# Patient Record
Sex: FEMALE | Race: BLACK OR AFRICAN AMERICAN | NOT HISPANIC OR LATINO | Employment: FULL TIME | URBAN - METROPOLITAN AREA
[De-identification: names, ages, dates, MRNs, and addresses within clinical notes are randomized per-mention and may not be internally consistent; named-entity substitution may affect disease eponyms.]

---

## 2017-01-02 PROBLEM — Z34.90 NORMAL PREGNANCY: Status: ACTIVE | Noted: 2017-01-02

## 2017-01-05 ENCOUNTER — ROUTINE PRENATAL (OUTPATIENT)
Dept: OBSTETRICS AND GYNECOLOGY | Facility: CLINIC | Age: 32
End: 2017-01-05
Payer: MEDICAID

## 2017-01-05 DIAGNOSIS — Z34.93 PREGNANCY WITH ONE FETUS, THIRD TRIMESTER: Primary | ICD-10-CM

## 2017-01-05 DIAGNOSIS — O24.913 DIABETES MELLITUS AFFECTING PREGNANCY IN THIRD TRIMESTER: ICD-10-CM

## 2017-01-05 PROCEDURE — 99999 PR PBB SHADOW E&M-EST. PATIENT-LVL II: CPT | Mod: PBBFAC,,, | Performed by: OBSTETRICS & GYNECOLOGY

## 2017-01-05 PROCEDURE — 99213 OFFICE O/P EST LOW 20 MIN: CPT | Mod: TH,S$PBB,, | Performed by: OBSTETRICS & GYNECOLOGY

## 2017-01-05 PROCEDURE — 99212 OFFICE O/P EST SF 10 MIN: CPT | Mod: PBBFAC | Performed by: OBSTETRICS & GYNECOLOGY

## 2017-01-05 NOTE — MR AVS SNAPSHOT
[facility] - OB/ GYN  [address], [address]  [place] [address]  Phone: [phone]                  Isabel Curry   2017 8:40 AM   Routine Prenatal    Description:  Female : 1985   Provider:  Chavez Gonzales MD   Department:  [facility] - OB/ GYN           Reason for Visit     Routine Prenatal Visit                To Do List           Future Appointments        Provider Department Dept Phone    2017 10:10 AM Chavez Gonzales MD [facility] - OB/ [phone]      Goals (5 Years of Data)     None      [facility]     [facility] Nurse Care Line -  Assistance  Registered nurses in the [facility] provide clinical advisement, health education, appointment booking, and other advisory services.  Call for this free service at [phone].             Medications           Message regarding Medications     Verify the changes and/or additions to your medication regime listed below are the same as discussed with your clinician today.  If any of these changes or additions are incorrect, please notify your healthcare provider.             Verify that the below list of medications is an accurate representation of the medications you are currently taking.  If none reported, the list may be blank. If incorrect, please contact your healthcare provider. Carry this list with you in case of emergency.           Current Medications     blood sugar diagnostic Strp 1 strip by Misc.(Non-Drug; Combo Route) route 4 (four) times daily as needed (fasting and 2 hours after each meals).    blood-glucose meter kit Use as instructed    EASY TOUCH LANCING DEVICE Misc test FOUR TIMES DAILY    ferrous gluconate (FERGON) 325 MG Tab Take 1 tablet (325 mg total) by mouth 2 (two) times daily with meals.    glyBURIDE (DIABETA) 2.5 MG tablet Take 0.5 tablets (1.25 mg total) by mouth 2 (two) times daily with meals.    lancets 30 gauge Misc 1 lancet by Misc.(Non-Drug; Combo Route) route 4 (four)  times daily as needed.    prenatal multivit-Ca-min-Fe-FA (PRENATAL VITAMIN) Tab Take 1 tablet by mouth once daily.    ranitidine (ZANTAC) 150 MG tablet Take 1 tablet (150 mg total) by mouth nightly as needed for Heartburn.    docusate sodium (COLACE) 100 MG capsule Take 1 capsule (100 mg total) by mouth 2 (two) times daily as needed for Constipation.    promethazine (PHENERGAN) 12.5 MG Tab Take 1 tablet (12.5 mg total) by mouth every 6 (six) hours as needed (nausea).           Clinical Reference Information           Prenatal Vitals     Enc. Date GA Prenatal Vitals Prenatal Pulse Pain Level Urine Albumin/Glucose Edema Presentation Dilation/Effacement/Station    1/5/17 34w0d 140/100 (A) / 91.9 kg (202 lb 9.6 oz)   0        1/2/17 33w4d Admission Dx: Normal pregnancy Dept: [facility] L&D    12/29/16 33w0d 127/81 / 92.6 kg (204 lb 2.3 oz) 34 cm / 130 NST / Present 64 0 Negative / Negative None / None      12/26/16 32w4d Admission Dx: Pregnancy Dept: [facility] L[facility]D    12/22/16 32w0d 124/77 / 90.7 kg (199 lb 15.3 oz) 33 cm / 135 NST / Present 72 0 Negative / Negative None / None      12/19/16 31w4d 120/70 / 92.2 kg (203 lb 4.2 oz) 32 cm / 150 NST / Present 60 0 Negative / Negative None / None      12/13/16 30w5d 123/76 / 90.9 kg (200 lb 6.4 oz) 31 cm / 142 / Present 68 0 Negative / Negative None / None      11/15/16 26w5d 120/70 / 89.9 kg (198 lb 3.1 oz) 26 cm / 146 / Present 70 0 Negative / Negative None / None      10/18/16 22w5d 110/74 / 88.2 kg (194 lb 8 oz)  / 150 / Present 64 0 Negative / Negative None / None      10/4/16 20w5d 121/76 / 87.5 kg (193 lb)  / 146 / Present 60 0 Negative / 1+ None / None      9/20/16 18w5d 120/80 / 86.6 kg (191 lb)  / 154 / Present 72 0 Negative / 1+ None / None      9/13/16 17w5d 119/79 / 86.6 kg (191 lb)  / 160 64 0 Negative / Negative None / None      8/16/16 13w5d 121/79 / 85.7 kg (189 lb)  / 158 70 0 Negative / Negative None / None  0 / 0      Vital Signs - Last Recorded  Most recent update:  1/5/2017  8:49 AM by Marylin Mart MA    BP Wt LMP BMI       (!) 140/100 91.9 kg (202 lb 9.6 oz) 05/20/2016 (Exact Date) 32.7 kg/m2       Allergies as of 1/5/2017     No Known Allergies      Immunizations Administered on Date of Encounter - 1/5/2017     None      [facility] Sign-Up     Activating your [facility] account is as easy as 1-2-3!     1) Visit [email or web address], select Sign Up Now, enter this activation code and your date of birth, then select Next.  [email or web address]  Expires: 2/16/2017  3:29 PM      2) Create a username and password to use when you visit [facility] in the future and select a security question in case you lose your password and select Next.    3) Enter your e-mail address and click Sign Up!    Additional Information  If you have questions, please e-mail [email or web address] or call [phone] to talk to our [facility] staff. Remember, [facility] is NOT to be used for urgent needs. For medical emergencies, dial 911.

## 2017-01-06 VITALS
WEIGHT: 202.63 LBS | DIASTOLIC BLOOD PRESSURE: 86 MMHG | BODY MASS INDEX: 32.7 KG/M2 | SYSTOLIC BLOOD PRESSURE: 132 MMHG | HEART RATE: 70 BPM

## 2017-01-06 PROBLEM — O24.419 GDM, CLASS A2: Status: ACTIVE | Noted: 2017-01-06

## 2017-01-06 PROBLEM — O24.913 DIABETES MELLITUS AFFECTING PREGNANCY IN THIRD TRIMESTER: Status: ACTIVE | Noted: 2017-01-06

## 2017-01-06 PROBLEM — Z98.891 H/O CESAREAN SECTION: Status: ACTIVE | Noted: 2017-01-06

## 2017-01-06 NOTE — PROGRESS NOTES
34w0d with DM-type 2 on glyburide, Hep B chronic carrier,  x 2.     Pt here for OB visit and NST.  Reports no major omplaints.   Active fetus.  Denies vaginal bleeding, leakage of fluid, or contractions.    Glucose log reviewed since last visit .  Fasting  and 2 hrs .  Pt states she sometime forgets to take her glyburide during day.  Continue glyburide to 1.25 mg qhs and 2.5 mg at qnoon.  Pt advised to go to L&D if fasting > 120, 2hr > 160.   Encourage healthy lifestyle modifications.    Pt initial BP on arrival was 140/100.  Repeat BP at rest 132/86.  Denies headaches, vision changes, epigastric pain, or acute swelling.  Pt was advised to start home BP monitoring TID.  Implications of uncontrolled HTN on her pregnancy reviewed.  Parameters to call reviewed.  Go to L&D if BP > 150/95.  pre-eclampsia precautions.    NST: Baseline 135, moderate variability, positive acceleration, no decelerations.   [place]: Irritability.  Continue twice wkly NST ~32 wks until delivery.    Labor/DM/preE precautions.  Kick counts.  Voiced understanding.

## 2017-01-09 ENCOUNTER — ROUTINE PRENATAL (OUTPATIENT)
Dept: OBSTETRICS AND GYNECOLOGY | Facility: CLINIC | Age: 32
End: 2017-01-09
Payer: MEDICAID

## 2017-01-09 VITALS
HEART RATE: 68 BPM | WEIGHT: 207.44 LBS | SYSTOLIC BLOOD PRESSURE: 132 MMHG | DIASTOLIC BLOOD PRESSURE: 82 MMHG | BODY MASS INDEX: 33.48 KG/M2

## 2017-01-09 DIAGNOSIS — Z34.93 PREGNANCY WITH ONE FETUS, THIRD TRIMESTER: Primary | ICD-10-CM

## 2017-01-09 DIAGNOSIS — O24.913 DIABETES MELLITUS AFFECTING PREGNANCY IN THIRD TRIMESTER: ICD-10-CM

## 2017-01-09 DIAGNOSIS — O16.3 HYPERTENSION AFFECTING PREGNANCY IN THIRD TRIMESTER: ICD-10-CM

## 2017-01-09 PROCEDURE — 99213 OFFICE O/P EST LOW 20 MIN: CPT | Mod: 25,TH,S$PBB, | Performed by: OBSTETRICS & GYNECOLOGY

## 2017-01-09 PROCEDURE — 59025 FETAL NON-STRESS TEST: CPT | Mod: 26,S$PBB,, | Performed by: OBSTETRICS & GYNECOLOGY

## 2017-01-09 PROCEDURE — 59025 FETAL NON-STRESS TEST: CPT | Mod: PBBFAC | Performed by: OBSTETRICS & GYNECOLOGY

## 2017-01-09 PROCEDURE — 99999 PR PBB SHADOW E&M-EST. PATIENT-LVL I: CPT | Mod: PBBFAC,,, | Performed by: OBSTETRICS & GYNECOLOGY

## 2017-01-09 PROCEDURE — 99211 OFF/OP EST MAY X REQ PHY/QHP: CPT | Mod: PBBFAC,25 | Performed by: OBSTETRICS & GYNECOLOGY

## 2017-01-09 NOTE — PROGRESS NOTES
34w4d with DM-type 2 on glyburide, Hep B chronic carrier,  x 2.     Here for OB visit and NST.  No major omplaints.   Reports active fetus.  Denies vaginal bleeding, leakage of fluid, or contractions.    Glucose log reviewed since last visit .  Fasting 67-91 and 2 hrs  (isolated 204, ate too much desert).  Continue glyburide to 1.25 mg qhs and 2.5 mg at qnoon.  Go to L&D if fasting > 120, 2hr > 160.   Encourage healthy lifestyle modifications.    BP log reviewed, mostly normal range 120-130's/70-80's, few in mild range 140's/102.   BP normal today at clinic.  Denies headaches, vision changes, epigastric pain, or acute swelling.  Continue home BP monitoring TID.  Parameters to call reviewed.  Go to L&D if BP > 150/95.  PreE precautions.    NST: Baseline 140's, moderate variability, positive acceleration, no decelerations.   [place]: no ctx.  Continue twice wkly NST until delivery.    Labor/DM/preE precautions.  Kick counts.  Voiced understanding.

## 2017-01-12 ENCOUNTER — ROUTINE PRENATAL (OUTPATIENT)
Dept: OBSTETRICS AND GYNECOLOGY | Facility: CLINIC | Age: 32
End: 2017-01-12
Payer: MEDICAID

## 2017-01-12 ENCOUNTER — HOSPITAL ENCOUNTER (OUTPATIENT)
Facility: HOSPITAL | Age: 32
Discharge: HOME | End: 2017-01-12
Attending: OBSTETRICS & GYNECOLOGY | Admitting: OBSTETRICS & GYNECOLOGY
Payer: MEDICAID

## 2017-01-12 VITALS
WEIGHT: 206.69 LBS | RESPIRATION RATE: 18 BRPM | OXYGEN SATURATION: 100 % | SYSTOLIC BLOOD PRESSURE: 110 MMHG | DIASTOLIC BLOOD PRESSURE: 70 MMHG | HEART RATE: 62 BPM | BODY MASS INDEX: 33.36 KG/M2 | HEART RATE: 82 BPM | SYSTOLIC BLOOD PRESSURE: 126 MMHG | DIASTOLIC BLOOD PRESSURE: 75 MMHG

## 2017-01-12 DIAGNOSIS — O16.3 HYPERTENSION AFFECTING PREGNANCY IN THIRD TRIMESTER: ICD-10-CM

## 2017-01-12 DIAGNOSIS — Z34.93 PREGNANCY WITH ONE FETUS IN THIRD TRIMESTER: ICD-10-CM

## 2017-01-12 DIAGNOSIS — O24.419 GDM, CLASS A2: ICD-10-CM

## 2017-01-12 DIAGNOSIS — Z34.93 PREGNANCY WITH ONE FETUS, THIRD TRIMESTER: Primary | ICD-10-CM

## 2017-01-12 DIAGNOSIS — Z34.93 PREGNANCY WITH ONE FETUS, THIRD TRIMESTER: ICD-10-CM

## 2017-01-12 PROCEDURE — 99213 OFFICE O/P EST LOW 20 MIN: CPT | Mod: 25,TH,S$PBB, | Performed by: OBSTETRICS & GYNECOLOGY

## 2017-01-12 PROCEDURE — 99999 PR PBB SHADOW E&M-EST. PATIENT-LVL II: CPT | Mod: PBBFAC,,, | Performed by: OBSTETRICS & GYNECOLOGY

## 2017-01-12 PROCEDURE — 59025 FETAL NON-STRESS TEST: CPT

## 2017-01-12 PROCEDURE — 59025 FETAL NON-STRESS TEST: CPT | Mod: 26,S$PBB,, | Performed by: OBSTETRICS & GYNECOLOGY

## 2017-01-12 RX ORDER — ACETAMINOPHEN 500 MG
500 TABLET ORAL EVERY 6 HOURS PRN
Status: CANCELLED | OUTPATIENT
Start: 2017-01-12

## 2017-01-12 RX ORDER — ONDANSETRON 4 MG/1
8 TABLET, ORALLY DISINTEGRATING ORAL EVERY 8 HOURS PRN
Status: CANCELLED | OUTPATIENT
Start: 2017-01-12

## 2017-01-12 RX ORDER — ONDANSETRON 8 MG/1
8 TABLET, ORALLY DISINTEGRATING ORAL EVERY 8 HOURS PRN
Status: DISCONTINUED | OUTPATIENT
Start: 2017-01-12 | End: 2017-01-12 | Stop reason: HOSPADM

## 2017-01-12 RX ORDER — ACETAMINOPHEN 500 MG
500 TABLET ORAL EVERY 6 HOURS PRN
Status: DISCONTINUED | OUTPATIENT
Start: 2017-01-12 | End: 2017-01-12 | Stop reason: HOSPADM

## 2017-01-12 NOTE — NURSING
bg 100. Reactive nst. Pt wants to go home and eat. Will speak to md.    1120  Dr Gonzales ordered to monitor for another 15 min then may discharge home,rb&v.    1155 discharge instructions reviewed with handouts provided and verbal recall.

## 2017-01-12 NOTE — IP AVS SNAPSHOT
[place] [address] [place] [address]  Phone: [phone]           Patient Discharge Instructions     Our goal is to set you up for success. This packet includes information on your condition, medications, and your home care. It will help you to care for yourself so you don't get sicker and need to go back to the hospital.     Please ask your nurse if you have any questions.        There are many details to remember when preparing to leave the hospital. Here is what you will need to do:    1. Take your medicine. If you are prescribed medications, review your Medication List in the following pages. You may have new medications to  at the pharmacy and others that you'll need to stop taking. Review the instructions for how and when to take your medications. Talk with your doctor or nurses if you are unsure of what to do.     2. Go to your follow-up appointments. Specific follow-up information is listed in the following pages. Your may be contacted by a transition nurse or clinical provider about future appointments. Be sure we have all of the phone numbers to reach you, if needed. Please contact your provider's office if you are unable to make an appointment.     3. Watch for warning signs. Your doctor or nurse will give you detailed warning signs to watch for and when to call for assistance. These instructions may also include educational information about your condition. If you experience any of warning signs to your health, call your doctor.               [facility]  Unless otherwise directed by your provider, please contact [facility], our nurse care line that is available for 24/7 assistance.     [phone] (toll-free)    Registered nurses in the [facility] provide clinical advisement, health education, appointment booking, and other advisory services.                    ** Verify the list of medication(s) below is accurate and up to date.  Carry this with you in case of emergency. If your medications have changed, please notify your healthcare provider.             Medication List      ASK your doctor about these medications        Additional Info                      blood sugar diagnostic Strp   Quantity:  100 strip   Refills:  11   Dose:  1 strip    Instructions:  1 strip by Misc.(Non-Drug; Combo Route) route 4 (four) times daily as needed (fasting and 2 hours after each meals).     Begin Date    AM    Noon    PM    Bedtime       blood-glucose meter kit   Quantity:  1 each   Refills:  0   Comments:  Please dispense testing supplies covered by her insurance.    Instructions:  Use as instructed     Begin Date    AM    Noon    PM    Bedtime       docusate sodium 100 MG capsule   Commonly known as:  COLACE   Quantity:  60 capsule   Refills:  1   Dose:  100 mg    Instructions:  Take 1 capsule (100 mg total) by mouth 2 (two) times daily as needed for Constipation.     Begin Date    AM    Noon    PM    Bedtime       EASY TOUCH LANCING DEVICE Misc   Refills:  0   Generic drug:  lancing device    Instructions:  test FOUR TIMES DAILY     Begin Date    AM    Noon    PM    Bedtime       ferrous gluconate 325 MG Tab   Commonly known as:  FERGON   Quantity:  60 tablet   Refills:  1   Dose:  325 mg    Instructions:  Take 1 tablet (325 mg total) by mouth 2 (two) times daily with meals.     Begin Date    AM    Noon    PM    Bedtime       glyBURIDE 2.5 MG tablet   Commonly known as:  DIABETA   Quantity:  90 tablet   Refills:  1   Dose:  1.25 mg    Instructions:  Take 0.5 tablets (1.25 mg total) by mouth 2 (two) times daily with meals.     Begin Date    AM    Noon    PM    Bedtime       lancets 30 gauge Misc   Quantity:  100 each   Refills:  11   Dose:  1 lancet   Comments:  Please dispense testing supplies covered by her insurance.    Instructions:  1 lancet by Misc.(Non-Drug; Combo Route) route 4 (four) times daily as needed.     Begin Date    AM    Noon    PM     Bedtime       prenatal multivit-Ca-min-Fe-FA Tab   Commonly known as:  PRENATAL VITAMIN   Quantity:  30 each   Refills:  11   Dose:  1 tablet   Comments:  Please substitute for a comparable prenatal vitamins covered by patient's insurance plan affordable to patient. Also, dispense a 90 days supply when possible if requested by patient. Thanks.    Instructions:  Take 1 tablet by mouth once daily.     Begin Date    AM    Noon    PM    Bedtime       promethazine 12.5 MG Tab   Commonly known as:  PHENERGAN   Quantity:  30 tablet   Refills:  1   Dose:  12.5 mg    Instructions:  Take 1 tablet (12.5 mg total) by mouth every 6 (six) hours as needed (nausea).     Begin Date    AM    Noon    PM    Bedtime       ranitidine 150 MG tablet   Commonly known as:  ZANTAC   Quantity:  30 tablet   Refills:  1   Dose:  150 mg    Instructions:  Take 1 tablet (150 mg total) by mouth nightly as needed for Heartburn.     Begin Date    AM    Noon    PM    Bedtime                  Please bring to all follow up appointments:    1. A copy of your discharge instructions.  2. All medicines you are currently taking in their original bottles.  3. Identification and insurance card.    Please arrive 15 minutes ahead of scheduled appointment time.    Please call 24 hours in advance if you must reschedule your appointment and/or time.        Your Scheduled Appointments     Jan 16, 2017  9:40 AM CST   Routine Prenatal Visit with Chavez Gonzales MD   [facility] [place] [facility] [address] [address]   [phone]            Jan 19, 2017 10:00 AM CST   Routine Prenatal Visit with Chavez Gonzales MD   [facility] [place] [facility] [address] [address]   [phone]                  Discharge Instructions       Home Undelivered Discharge Instructions    After Discharge Orders:    Future Appointments  Date Time Provider Department Center   1/16/2017 9:40 AM Chavez Gonzales MD [facility] OBGYN  [facility]   1/19/2017 10:00 AM Chavez Gonzales MD [facility] [place]       Call physician or midwife's office for any problems or concerns      Current Discharge Medication List      CONTINUE these medications which have NOT CHANGED    Details   blood sugar diagnostic Strp 1 strip by Misc.(Non-Drug; Combo Route) route 4 (four) times daily as needed (fasting and 2 hours after each meals).  Qty: 100 strip, Refills: 11    Associated Diagnoses: Diabetes mellitus complicating pregnancy, antepartum, unspecified trimester      blood-glucose meter kit Use as instructed  Qty: 1 each, Refills: 0    Comments: Please dispense testing supplies covered by her insurance.  Associated Diagnoses: Diabetes mellitus complicating pregnancy, antepartum, unspecified trimester      docusate sodium (COLACE) 100 MG capsule Take 1 capsule (100 mg total) by mouth 2 (two) times daily as needed for Constipation.  Qty: 60 capsule, Refills: 1    Associated Diagnoses: Anemia in pregnancy, unspecified trimester      EASY TOUCH LANCING DEVICE Misc test FOUR TIMES DAILY  Refills: 0      ferrous gluconate (FERGON) 325 MG Tab Take 1 tablet (325 mg total) by mouth 2 (two) times daily with meals.  Qty: 60 tablet, Refills: 1    Associated Diagnoses: Anemia in pregnancy, unspecified trimester      glyBURIDE (DIABETA) 2.5 MG tablet Take 0.5 tablets (1.25 mg total) by mouth 2 (two) times daily with meals.  Qty: 90 tablet, Refills: 1    Associated Diagnoses: Diabetes mellitus affecting pregnancy, second trimester      lancets 30 gauge Misc 1 lancet by Misc.(Non-Drug; Combo Route) route 4 (four) times daily as needed.  Qty: 100 each, Refills: 11    Comments: Please dispense testing supplies covered by her insurance.  Associated Diagnoses: Diabetes mellitus complicating pregnancy, antepartum, unspecified trimester      prenatal multivit-Ca-min-Fe-FA (PRENATAL VITAMIN) Tab Take 1 tablet by mouth once daily.  Qty: 30 each, Refills: 11    Comments: Please  substitute for a comparable prenatal vitamins covered by patient's insurance plan affordable to patient. Also, dispense a 90 days supply when possible if requested by patient. Thanks.  Associated Diagnoses: Encounter for confirmation of pregnancy test result with physical examination      promethazine (PHENERGAN) 12.5 MG Tab Take 1 tablet (12.5 mg total) by mouth every 6 (six) hours as needed (nausea).  Qty: 30 tablet, Refills: 1    Associated Diagnoses: Nausea/vomiting in pregnancy      ranitidine (ZANTAC) 150 MG tablet Take 1 tablet (150 mg total) by mouth nightly as needed for Heartburn.  Qty: 30 tablet, Refills: 1    Associated Diagnoses: Gastroesophageal reflux disease, esophagitis presence not specified                     · Diet:  normal diet as tolerated    · Rest: normal activity as tolerated    Other instructions: Do kick counts once a day on your baby. Choose the time of day your baby is most active. Get in a comfortable lying or sitting position and time how long it takes to feel 10 kicks, twists, turns, swishes, or rolls. Call your physician or midwife if there have not been 10 kicks in 1.5 hours    Call physician or midwife, return to Labor and Delivery, call 911, or go to the nearest Emergency Room if: increased leakage or fluid, contractions more than  6 per  1 hour, decreased fetal movement, bleeding from vaginal area, difficulty urinating, pain with urination, difficulty breathing, new calf pain, persistent headache or vision change                 Admission Information     Date & Time Provider Department CSN    1/12/2017 10:42 AM Chavez Gonzales MD [facility] [id number]      Care Providers     Provider Role Specialty Primary office phone    Chavez Gonzales MD Attending Provider Obstetrics and Gynecology [phone]      Your Vitals Were     Last Period                   05/20/2016 (Exact Date)           Recent Lab Values        6/6/2013 8/16/2016 11/15/2016                     7:00  PM  9:40 AM 10:17 AM         A1C 6.5 (H) 6.3 (H) 5.9         Comment for A1C at  9:40 AM on 8/16/2016:  According to ADA guidelines, hemoglobin A1C <7.0% represents  optimal control in non-pregnant diabetic patients.  Different  metrics may apply to specific populations.   Standards of Medical Care in Diabetes - 2016.  For the purpose of screening for the presence of diabetes:  <5.7%     Consistent with the absence of diabetes  5.7-6.4%  Consistent with increasing risk for diabetes   (prediabetes)  >or=6.5%  Consistent with diabetes  Currently no consensus exists for use of hemoglobin A1C  for diagnosis of diabetes for children.      Comment for A1C at 10:17 AM on 11/15/2016:  According to ADA guidelines, hemoglobin A1C <7.0% represents  optimal control in non-pregnant diabetic patients.  Different  metrics may apply to specific populations.   Standards of Medical Care in Diabetes - 2016.  For the purpose of screening for the presence of diabetes:  <5.7%     Consistent with the absence of diabetes  5.7-6.4%  Consistent with increasing risk for diabetes   (prediabetes)  >or=6.5%  Consistent with diabetes  Currently no consensus exists for use of hemoglobin A1C  for diagnosis of diabetes for children.        Allergies as of 1/12/2017     No Known Allergies      Advance Directives     An advance directive is a document which, in the event you are no longer able to make decisions for yourself, tells your healthcare team what kind of treatment you do or do not want to receive, or who you would like to make those decisions for you.  If you do not currently have an advance directive, [facility] encourages you to create one.  For more information call:  [phone]), [phone] ([phone]),  or log on to [email or web address].        Language Assistance Services     ATTENTION: Language assistance services are available, free of charge. Please call [phone].      ATENCIÓN: alyssia Pabon  almanza disposición servicios gratuitos de asistencia lingüística. Llgeorgia al [phone].     [facility] Ý: N?u b?n nói Ti?ng Vi?t, có các d?ch v? h? tr? ngôn ng? mi?n phí dành cho b?n. G?i s? [phone].        Diabetes Discharge Instructions                                   [facility] Sign-Up     Activating your [facility] account is as easy as 1-2-3!     1) Visit [email or web address], select Sign Up Now, enter this activation code and your date of birth, then select Next.  [email or web address]  Expires: 2/16/2017  3:29 PM      2) Create a username and password to use when you visit [facility] in the future and select a security question in case you lose your password and select Next.    3) Enter your e-mail address and click Sign Up!    Additional Information  If you have questions, please e-mail [email or web address] or call [phone] to talk to our [facility] staff. Remember, [facility] is NOT to be used for urgent needs. For medical emergencies, dial 911.          [facility] complies with applicable Federal civil rights laws and does not discriminate on the basis of race, color, national origin, age, disability, or sex.

## 2017-01-12 NOTE — MR AVS SNAPSHOT
[facility] - OB/ GYN  [address], [address]  [place] [address]  Phone: [phone]                  Isabel Curry   2017 10:10 AM   Routine Prenatal    Description:  Female : 1985   Provider:  Chavez Gonzales MD   Department:  [facility] [place] - OB/ GYN           Reason for Visit     Routine Prenatal Visit                To Do List           Future Appointments        Provider Department Dept Phone    2017 10:10 AM Chavez Gonzales MD [facility] OB/ -[phone]    2017 9:40 AM Chavez Gonzales MD [facility] OB/ -[phone]    2017 10:00 AM Chavez Gonzales MD [facility] OB/ -[phone]      Goals (5 Years of Data)     None      [facility]     [facility] Care [facility] - / Assistance  Registered nurses in the [facility] On Call Center provide clinical advisement, health education, appointment booking, and other advisory services.  Call for this free service at [phone].             Medications           Message regarding Medications     Verify the changes and/or additions to your medication regime listed below are the same as discussed with your clinician today.  If any of these changes or additions are incorrect, please notify your healthcare provider.             Verify that the below list of medications is an accurate representation of the medications you are currently taking.  If none reported, the list may be blank. If incorrect, please contact your healthcare provider. Carry this list with you in case of emergency.           Current Medications     blood sugar diagnostic Strp 1 strip by Misc.(Non-Drug; Combo Route) route 4 (four) times daily as needed (fasting and 2 hours after each meals).    blood-glucose meter kit Use as instructed    docusate sodium (COLACE) 100 MG capsule Take 1 capsule (100 mg total) by mouth 2 (two) times daily as needed for Constipation.    EASY TOUCH LANCING DEVICE Misc test FOUR TIMES DAILY    ferrous gluconate (FERGON)  325 MG Tab Take 1 tablet (325 mg total) by mouth 2 (two) times daily with meals.    glyBURIDE (DIABETA) 2.5 MG tablet Take 0.5 tablets (1.25 mg total) by mouth 2 (two) times daily with meals.    lancets 30 gauge Misc 1 lancet by Misc.(Non-Drug; Combo Route) route 4 (four) times daily as needed.    prenatal multivit-Ca-min-Fe-FA (PRENATAL VITAMIN) Tab Take 1 tablet by mouth once daily.    promethazine (PHENERGAN) 12.5 MG Tab Take 1 tablet (12.5 mg total) by mouth every 6 (six) hours as needed (nausea).    ranitidine (ZANTAC) 150 MG tablet Take 1 tablet (150 mg total) by mouth nightly as needed for Heartburn.           Clinical Reference Information           Prenatal Vitals     Enc. Date GA Prenatal Vitals Prenatal Pulse Pain Level Urine Albumin/Glucose Edema Presentation Dilation/Effacement/Station    1/12/17 35w0d 110/70 / 93.8 kg (206 lb 10.9 oz)    Negative / Negative       1/9/17 34w4d 132/82 / 94.1 kg (207 lb 7.3 oz) 34 cm / 140 NST / Present 68 0 Trace / Negative None / None      1/5/17 34w0d 132/86 / 91.9 kg (202 lb 9.6 oz) 34 cm / 135 NST / Present 70 0 Negative / Negative None / None      1/2/17 33w4d Admission Dx: Normal pregnancy Dept: [facility] L&D    12/29/16 33w0d 127/81 / 92.6 kg (204 lb 2.3 oz) 34 cm / 130 NST / Present 64 0 Negative / Negative None / None      12/26/16 32w4d Admission Dx: Pregnancy Dept: [facility] L&D    12/22/16 32w0d 124/77 / 90.7 kg (199 lb 15.3 oz) 33 cm / 135 NST / Present 72 0 Negative / Negative None / None      12/19/16 31w4d 120/70 / 92.2 kg (203 lb 4.2 oz) 32 cm / 150 NST / Present 60 0 Negative / Negative None / None      12/13/16 30w5d 123/76 / 90.9 kg (200 lb 6.4 oz) 31 cm / 142 / Present 68 0 Negative / Negative None / None      11/15/16 26w5d 120/70 / 89.9 kg (198 lb 3.1 oz) 26 cm / 146 / Present 70 0 Negative / Negative None / None      10/18/16 22w5d 110/74 / 88.2 kg (194 lb 8 oz)  / 150 / Present 64 0 Negative / Negative None / None      10/4/16 20w5d 121/76 / 87.5 kg (193  lb)  / 146 / Present 60 0 Negative / 1+ None / None      9/20/16 18w5d 120/80 / 86.6 kg (191 lb)  / 154 / Present 72 0 Negative / 1+ None / None      9/13/16 17w5d 119/79 / 86.6 kg (191 lb)  / 160 64 0 Negative / Negative None / None      8/16/16 13w5d 121/79 / 85.7 kg (189 lb)  / 158 70 0 Negative / Negative None / None  0 / 0      Vital Signs - Last Recorded  Most recent update: 1/12/2017  9:03 AM by Silvia Damico, LPN    BP Wt LMP BMI       110/70 93.8 kg (206 lb 10.9 oz) 05/20/2016 (Exact Date) 33.36 kg/m2       Allergies as of 1/12/2017     No Known Allergies      Immunizations Administered on Date of Encounter - 1/12/2017     None      [facility] Sign-Up     Activating your [facility] account is as easy as 1-2-3!     1) Visit [email or web address], select Sign Up Now, enter this activation code and your date of birth, then select Next.  [email or web address]  Expires: 2/16/2017  3:29 PM      2) Create a username and password to use when you visit [facility] in the future and select a security question in case you lose your password and select Next.    3) Enter your e-mail address and click Sign Up!    Additional Information  If you have questions, please e-mail [email or web address] or call [phone] to talk to our [facility] staff. Remember, [facility] is NOT to be used for urgent needs. For medical emergencies, dial 911.

## 2017-01-12 NOTE — DISCHARGE INSTRUCTIONS
Home Undelivered Discharge Instructions    After Discharge Orders:    Future Appointments  Date Time Provider Department Center   1/16/2017 9:40 AM Chavez Gonzales MD [facility]   1/19/2017 10:00 AM Chavez Gonzales MD [facility]       Call physician or midwife's office for any problems or concerns      Current Discharge Medication List      CONTINUE these medications which have NOT CHANGED    Details   blood sugar diagnostic Strp 1 strip by Misc.(Non-Drug; Combo Route) route 4 (four) times daily as needed (fasting and 2 hours after each meals).  Qty: 100 strip, Refills: 11    Associated Diagnoses: Diabetes mellitus complicating pregnancy, antepartum, unspecified trimester      blood-glucose meter kit Use as instructed  Qty: 1 each, Refills: 0    Comments: Please dispense testing supplies covered by her insurance.  Associated Diagnoses: Diabetes mellitus complicating pregnancy, antepartum, unspecified trimester      docusate sodium (COLACE) 100 MG capsule Take 1 capsule (100 mg total) by mouth 2 (two) times daily as needed for Constipation.  Qty: 60 capsule, Refills: 1    Associated Diagnoses: Anemia in pregnancy, unspecified trimester      EASY TOUCH LANCING DEVICE Misc test FOUR TIMES DAILY  Refills: 0      ferrous gluconate (FERGON) 325 MG Tab Take 1 tablet (325 mg total) by mouth 2 (two) times daily with meals.  Qty: 60 tablet, Refills: 1    Associated Diagnoses: Anemia in pregnancy, unspecified trimester      glyBURIDE (DIABETA) 2.5 MG tablet Take 0.5 tablets (1.25 mg total) by mouth 2 (two) times daily with meals.  Qty: 90 tablet, Refills: 1    Associated Diagnoses: Diabetes mellitus affecting pregnancy, second trimester      lancets 30 gauge Misc 1 lancet by Misc.(Non-Drug; Combo Route) route 4 (four) times daily as needed.  Qty: 100 each, Refills: 11    Comments: Please dispense testing supplies covered by her insurance.  Associated Diagnoses: Diabetes mellitus complicating pregnancy,  antepartum, unspecified trimester      prenatal multivit-Ca-min-Fe-FA (PRENATAL VITAMIN) Tab Take 1 tablet by mouth once daily.  Qty: 30 each, Refills: 11    Comments: Please substitute for a comparable prenatal vitamins covered by patient's insurance plan affordable to patient. Also, dispense a 90 days supply when possible if requested by patient. Thanks.  Associated Diagnoses: Encounter for confirmation of pregnancy test result with physical examination      promethazine (PHENERGAN) 12.5 MG Tab Take 1 tablet (12.5 mg total) by mouth every 6 (six) hours as needed (nausea).  Qty: 30 tablet, Refills: 1    Associated Diagnoses: Nausea/vomiting in pregnancy      ranitidine (ZANTAC) 150 MG tablet Take 1 tablet (150 mg total) by mouth nightly as needed for Heartburn.  Qty: 30 tablet, Refills: 1    Associated Diagnoses: Gastroesophageal reflux disease, esophagitis presence not specified                     · Diet:  normal diet as tolerated    · Rest: normal activity as tolerated    Other instructions: Do kick counts once a day on your baby. Choose the time of day your baby is most active. Get in a comfortable lying or sitting position and time how long it takes to feel 10 kicks, twists, turns, swishes, or rolls. Call your physician or midwife if there have not been 10 kicks in 1.5 hours    Call physician or midwife, return to Labor and Delivery, call 911, or go to the nearest Emergency Room if: increased leakage or fluid, contractions more than  6 per  1 hour, decreased fetal movement, bleeding from vaginal area, difficulty urinating, pain with urination, difficulty breathing, new calf pain, persistent headache or vision change

## 2017-01-12 NOTE — IP AVS SNAPSHOT
[address] [place] [address]  Phone: [phone]           I have received a copy of my After Visit Summary and discharge instructions from [facility].    INSTRUCTIONS RECEIVED AND UNDERSTOOD BY:                     Patient/Patient Representative: ________________________________________________________________     Date/Time: ________________________________________________________________                     Instructions Given By: ________________________________________________________________     Date/Time: ________________________________________________________________

## 2017-01-12 NOTE — NURSING
Presents to unit from md office for nst. Monitors applied. Active fetal movement. Fasting bg this am 82, no food yet today. Reviewed poc with verbal recall.

## 2017-01-13 LAB — POCT GLUCOSE: 100 MG/DL (ref 70–110)

## 2017-01-13 NOTE — PROGRESS NOTES
35w0d with DM-type 2 on glyburide, Hep B chronic carrier,  x 2.     Pt here for OB visit and NST secondary to GDM-A2.  Pt has no major omplaints.   Reports very active fetus.  Denies vaginal bleeding, leakage of fluid, or contractions.    Glucose log reviewed since last visit .  Fasting 67-82 and 2 hrs .  Continue glyburide to 1.25 mg qhs and 2.5 mg at qnoon.  Go to L&D if fasting > 120, 2hr > 160.     BP log reviewed, mostly normal range 120-130's/70-80's, few in mild range 140's/70-80's.   Denies headaches, vision changes, epigastric pain, or acute swelling.  Continue BP monitoring TID.  Go to L&D if BP > 150/95.  PreE precautions.    Will send to L&D for NST today.  Continue twice wkly NST until delivery.    Plan for delivery ~37-38 wks secondary to gestational HTN.    Labor/DM/preE precautions.  Kick counts.  Voiced understanding.

## 2017-01-16 ENCOUNTER — ROUTINE PRENATAL (OUTPATIENT)
Dept: OBSTETRICS AND GYNECOLOGY | Facility: CLINIC | Age: 32
End: 2017-01-16
Payer: MEDICAID

## 2017-01-16 VITALS
BODY MASS INDEX: 33.09 KG/M2 | SYSTOLIC BLOOD PRESSURE: 122 MMHG | HEART RATE: 60 BPM | DIASTOLIC BLOOD PRESSURE: 74 MMHG | WEIGHT: 205 LBS

## 2017-01-16 DIAGNOSIS — O16.3 HYPERTENSION AFFECTING PREGNANCY IN THIRD TRIMESTER: ICD-10-CM

## 2017-01-16 DIAGNOSIS — O24.419 GDM, CLASS A2: ICD-10-CM

## 2017-01-16 DIAGNOSIS — Z34.93 PREGNANCY WITH ONE FETUS, THIRD TRIMESTER: Primary | ICD-10-CM

## 2017-01-16 DIAGNOSIS — O24.913 DIABETES MELLITUS AFFECTING PREGNANCY IN THIRD TRIMESTER: ICD-10-CM

## 2017-01-16 PROCEDURE — 59025 FETAL NON-STRESS TEST: CPT | Mod: PBBFAC | Performed by: OBSTETRICS & GYNECOLOGY

## 2017-01-16 PROCEDURE — 99213 OFFICE O/P EST LOW 20 MIN: CPT | Mod: 25,TH,S$PBB, | Performed by: OBSTETRICS & GYNECOLOGY

## 2017-01-16 PROCEDURE — 99212 OFFICE O/P EST SF 10 MIN: CPT | Mod: PBBFAC,25 | Performed by: OBSTETRICS & GYNECOLOGY

## 2017-01-16 PROCEDURE — 99999 PR PBB SHADOW E&M-EST. PATIENT-LVL II: CPT | Mod: PBBFAC,,, | Performed by: OBSTETRICS & GYNECOLOGY

## 2017-01-16 PROCEDURE — 59025 FETAL NON-STRESS TEST: CPT | Mod: 26,S$PBB,, | Performed by: OBSTETRICS & GYNECOLOGY

## 2017-01-16 NOTE — MR AVS SNAPSHOT
[facility] - OB/ GYN  [address], [address]  [place] [address]  Phone: [phone]                  Isabel Curry   2017 9:40 AM   Routine Prenatal    Description:  Female : 1985   Provider:  Chavez Gonzales MD   Department:  [facility] - OB/ GYN           Reason for Visit     Routine Prenatal Visit                To Do List           Future Appointments        Provider Department Dept Phone    2017 10:00 AM Chavez Gonzales MD [facility] - OB/ -[phone]      Goals (5 Years of Data)     None      [facility]     [facility] Nurse Care Line -  Assistance  Registered nurses in the [facility] Center provide clinical advisement, health education, appointment booking, and other advisory services.  Call for this free service at [phone].             Medications           Message regarding Medications     Verify the changes and/or additions to your medication regime listed below are the same as discussed with your clinician today.  If any of these changes or additions are incorrect, please notify your healthcare provider.             Verify that the below list of medications is an accurate representation of the medications you are currently taking.  If none reported, the list may be blank. If incorrect, please contact your healthcare provider. Carry this list with you in case of emergency.           Current Medications     blood sugar diagnostic Strp 1 strip by Misc.(Non-Drug; Combo Route) route 4 (four) times daily as needed (fasting and 2 hours after each meals).    blood-glucose meter kit Use as instructed    docusate sodium (COLACE) 100 MG capsule Take 1 capsule (100 mg total) by mouth 2 (two) times daily as needed for Constipation.    EASY TOUCH LANCING DEVICE Misc test FOUR TIMES DAILY    ferrous gluconate (FERGON) 325 MG Tab Take 1 tablet (325 mg total) by mouth 2 (two) times daily with meals.    glyBURIDE (DIABETA) 2.5 MG tablet Take 0.5 tablets (1.25 mg  total) by mouth 2 (two) times daily with meals.    lancets 30 gauge Misc 1 lancet by Misc.(Non-Drug; Combo Route) route 4 (four) times daily as needed.    prenatal multivit-Ca-min-Fe-FA (PRENATAL VITAMIN) Tab Take 1 tablet by mouth once daily.    promethazine (PHENERGAN) 12.5 MG Tab Take 1 tablet (12.5 mg total) by mouth every 6 (six) hours as needed (nausea).    ranitidine (ZANTAC) 150 MG tablet Take 1 tablet (150 mg total) by mouth nightly as needed for Heartburn.           Clinical Reference Information           Prenatal Vitals     Enc. Date GA Prenatal Vitals Prenatal Pulse Pain Level Urine Albumin/Glucose Edema Presentation Dilation/Effacement/Station    1/16/17 35w4d 122/74 / 93 kg (205 lb)    Negative / Negative       1/12/17 35w0d Admission Dept: [facility] L&D    1/12/17 35w0d 110/70 / 93.8 kg (206 lb 10.9 oz) 35 cm / 146 / Present 62 0 Negative / Negative None / None      1/9/17 34w4d 132/82 / 94.1 kg (207 lb 7.3 oz) 34 cm / 140 NST / Present 68 0 Trace / Negative None / None      1/5/17 34w0d 132/86 / 91.9 kg (202 lb 9.6 oz) 34 cm / 135 NST / Present 70 0 Negative / Negative None / None      1/2/17 33w4d Admission Dx: Normal pregnancy Dept: [facility] L&D    12/29/16 33w0d 127/81 / 92.6 kg (204 lb 2.3 oz) 34 cm / 130 NST / Present 64 0 Negative / Negative None / None      12/26/16 32w4d Admission Dx: Pregnancy Dept: [facility] L&D    12/22/16 32w0d 124/77 / 90.7 kg (199 lb 15.3 oz) 33 cm / 135 NST / Present 72 0 Negative / Negative None / None      12/19/16 31w4d 120/70 / 92.2 kg (203 lb 4.2 oz) 32 cm / 150 NST / Present 60 0 Negative / Negative None / None      12/13/16 30w5d 123/76 / 90.9 kg (200 lb 6.4 oz) 31 cm / 142 / Present 68 0 Negative / Negative None / None      11/15/16 26w5d 120/70 / 89.9 kg (198 lb 3.1 oz) 26 cm / 146 / Present 70 0 Negative / Negative None / None      10/18/16 22w5d 110/74 / 88.2 kg (194 lb 8 oz)  / 150 / Present 64 0 Negative / Negative None / None      10/4/16 20w5d 121/76 / 87.5 kg (193  lb)  / 146 / Present 60 0 Negative / 1+ None / None      9/20/16 18w5d 120/80 / 86.6 kg (191 lb)  / 154 / Present 72 0 Negative / 1+ None / None      9/13/16 17w5d 119/79 / 86.6 kg (191 lb)  / 160 64 0 Negative / Negative None / None      8/16/16 13w5d 121/79 / 85.7 kg (189 lb)  / 158 70 0 Negative / Negative None / None  0 / 0      Vital Signs - Last Recorded  Most recent update: 1/16/2017 10:29 AM by Danni Madden MA    BP Wt LMP BMI       122/74 93 kg (205 lb) 05/20/2016 (Exact Date) 33.09 kg/m2       Allergies as of 1/16/2017     No Known Allergies      Immunizations Administered on Date of Encounter - 1/16/2017     None      [facility] Sign-Up     Activating your [facility] account is as easy as 1-2-3!     1) Visit [email or web address], select Sign Up Now, enter this activation code and your date of birth, then select Next.  [email or web address]  Expires: 2/16/2017  3:29 PM      2) Create a username and password to use when you visit [facility] in the future and select a security question in case you lose your password and select Next.    3) Enter your e-mail address and click Sign Up!    Additional Information  If you have questions, please e-mail [email or web address] or call [phone] to talk to our [facility] staff. Remember, [facility] is NOT to be used for urgent needs. For medical emergencies, dial 911.

## 2017-01-19 ENCOUNTER — ROUTINE PRENATAL (OUTPATIENT)
Dept: OBSTETRICS AND GYNECOLOGY | Facility: CLINIC | Age: 32
End: 2017-01-19
Payer: MEDICAID

## 2017-01-19 VITALS
HEART RATE: 72 BPM | SYSTOLIC BLOOD PRESSURE: 132 MMHG | BODY MASS INDEX: 33.41 KG/M2 | WEIGHT: 207 LBS | DIASTOLIC BLOOD PRESSURE: 72 MMHG

## 2017-01-19 DIAGNOSIS — O13.3 GESTATIONAL HYPERTENSION, THIRD TRIMESTER: ICD-10-CM

## 2017-01-19 DIAGNOSIS — O24.913 DIABETES MELLITUS AFFECTING PREGNANCY IN THIRD TRIMESTER: ICD-10-CM

## 2017-01-19 PROCEDURE — 59025 FETAL NON-STRESS TEST: CPT | Mod: PBBFAC | Performed by: OBSTETRICS & GYNECOLOGY

## 2017-01-19 PROCEDURE — 99212 OFFICE O/P EST SF 10 MIN: CPT | Mod: PBBFAC,25 | Performed by: OBSTETRICS & GYNECOLOGY

## 2017-01-19 PROCEDURE — 59025 FETAL NON-STRESS TEST: CPT | Mod: 26,S$PBB,, | Performed by: OBSTETRICS & GYNECOLOGY

## 2017-01-19 PROCEDURE — 99999 PR PBB SHADOW E&M-EST. PATIENT-LVL II: CPT | Mod: PBBFAC,,, | Performed by: OBSTETRICS & GYNECOLOGY

## 2017-01-19 PROCEDURE — 99213 OFFICE O/P EST LOW 20 MIN: CPT | Mod: 25,TH,S$PBB, | Performed by: OBSTETRICS & GYNECOLOGY

## 2017-01-19 NOTE — MR AVS SNAPSHOT
[facility] [place] - OB/ GYN  [address], [address]  [place] [address]  Phone: [phone]                  Isabel Curry   2017 10:00 AM   Routine Prenatal    Description:  Female : 1985   Provider:  Chavez Gonzales MD   Department:  [facility] [place] - OB/ GYN           Reason for Visit     Routine Prenatal Visit                To Do List           Goals (5 Years of Data)     None      [facility]     [facility] Nurse Care Line -  Assistance  Registered nurses in the [facility] Center provide clinical advisement, health education, appointment booking, and other advisory services.  Call for this free service at [phone].             Medications           Message regarding Medications     Verify the changes and/or additions to your medication regime listed below are the same as discussed with your clinician today.  If any of these changes or additions are incorrect, please notify your healthcare provider.             Verify that the below list of medications is an accurate representation of the medications you are currently taking.  If none reported, the list may be blank. If incorrect, please contact your healthcare provider. Carry this list with you in case of emergency.           Current Medications     blood sugar diagnostic Strp 1 strip by Misc.(Non-Drug; Combo Route) route 4 (four) times daily as needed (fasting and 2 hours after each meals).    blood-glucose meter kit Use as instructed    docusate sodium (COLACE) 100 MG capsule Take 1 capsule (100 mg total) by mouth 2 (two) times daily as needed for Constipation.    EASY TOUCH LANCING DEVICE Misc test FOUR TIMES DAILY    ferrous gluconate (FERGON) 325 MG Tab Take 1 tablet (325 mg total) by mouth 2 (two) times daily with meals.    glyBURIDE (DIABETA) 2.5 MG tablet Take 0.5 tablets (1.25 mg total) by mouth 2 (two) times daily with meals.    lancets 30 gauge Misc 1 lancet by Misc.(Non-Drug; Combo Route) route 4 (four) times  daily as needed.    prenatal multivit-Ca-min-Fe-FA (PRENATAL VITAMIN) Tab Take 1 tablet by mouth once daily.    promethazine (PHENERGAN) 12.5 MG Tab Take 1 tablet (12.5 mg total) by mouth every 6 (six) hours as needed (nausea).    ranitidine (ZANTAC) 150 MG tablet Take 1 tablet (150 mg total) by mouth nightly as needed for Heartburn.           Clinical Reference Information           Prenatal Vitals     Enc. Date GA Prenatal Vitals Prenatal Pulse Pain Level Urine Albumin/Glucose Edema Presentation Dilation/Effacement/Station    1/19/17 36w0d 132/72 / 93.9 kg (207 lb)    Negative / Trace       1/16/17 35w4d 122/74 / 93 kg (205 lb) 36 cm / 145 NST / Present 60 0 Negative / Negative None / None      1/12/17 35w0d Admission Dept: [facility] L[facility]    1/12/17 35w0d 110/70 / 93.8 kg (206 lb 10.9 oz) 35 cm / 146 / Present 62 0 Negative / Negative None / None      1/9/17 34w4d 132/82 / 94.1 kg (207 lb 7.3 oz) 34 cm / 140 NST / Present 68 0 Trace / Negative None / None      1/5/17 34w0d 132/86 / 91.9 kg (202 lb 9.6 oz) 34 cm / 135 NST / Present 70 0 Negative / Negative None / None      1/2/17 33w4d Admission Dx: Normal pregnancy Dept: [facility] L&D    12/29/16 33w0d 127/81 / 92.6 kg (204 lb 2.3 oz) 34 cm / 130 NST / Present 64 0 Negative / Negative None / None      12/26/16 32w4d Admission Dx: Pregnancy Dept: [facility] L&D    12/22/16 32w0d 124/77 / 90.7 kg (199 lb 15.3 oz) 33 cm / 135 NST / Present 72 0 Negative / Negative None / None      12/19/16 31w4d 120/70 / 92.2 kg (203 lb 4.2 oz) 32 cm / 150 NST / Present 60 0 Negative / Negative None / None      12/13/16 30w5d 123/76 / 90.9 kg (200 lb 6.4 oz) 31 cm / 142 / Present 68 0 Negative / Negative None / None      11/15/16 26w5d 120/70 / 89.9 kg (198 lb 3.1 oz) 26 cm / 146 / Present 70 0 Negative / Negative None / None      10/18/16 22w5d 110/74 / 88.2 kg (194 lb 8 oz)  / 150 / Present 64 0 Negative / Negative None / None      10/4/16 20w5d 121/76 / 87.5 kg (193 lb)  / 146 / Present 60 0  Negative / 1+ None / None      9/20/16 18w5d 120/80 / 86.6 kg (191 lb)  / 154 / Present 72 0 Negative / 1+ None / None      9/13/16 17w5d 119/79 / 86.6 kg (191 lb)  / 160 64 0 Negative / Negative None / None      8/16/16 13w5d 121/79 / 85.7 kg (189 lb)  / 158 70 0 Negative / Negative None / None  0 / 0      Vital Signs - Last Recorded  Most recent update: 1/19/2017 10:07 AM by Danni Madden MA    BP Wt LMP BMI       132/72 93.9 kg (207 lb) 05/20/2016 (Exact Date) 33.41 kg/m2       Allergies as of 1/19/2017     No Known Allergies      Immunizations Administered on Date of Encounter - 1/19/2017     None      [facility] Sign-Up     Activating your [facility] account is as easy as 1-2-3!     1) Visit [email or web address], select Sign Up Now, enter this activation code and your date of birth, then select Next.  [email or web address]  Expires: 2/16/2017  3:29 PM      2) Create a username and password to use when you visit [facility] in the future and select a security question in case you lose your password and select Next.    3) Enter your e-mail address and click Sign Up!    Additional Information  If you have questions, please e-mail [email or web address] or call [phone] to talk to our [facility] staff. Remember, [facility] is NOT to be used for urgent needs. For medical emergencies, dial 911.

## 2017-01-23 ENCOUNTER — ROUTINE PRENATAL (OUTPATIENT)
Dept: OBSTETRICS AND GYNECOLOGY | Facility: CLINIC | Age: 32
End: 2017-01-23
Payer: MEDICAID

## 2017-01-23 VITALS
HEART RATE: 70 BPM | WEIGHT: 208 LBS | DIASTOLIC BLOOD PRESSURE: 82 MMHG | SYSTOLIC BLOOD PRESSURE: 140 MMHG | BODY MASS INDEX: 33.57 KG/M2

## 2017-01-23 DIAGNOSIS — O13.3 GESTATIONAL HYPERTENSION, THIRD TRIMESTER: ICD-10-CM

## 2017-01-23 DIAGNOSIS — O13.3 PREGNANCY-INDUCED HYPERTENSION IN THIRD TRIMESTER: ICD-10-CM

## 2017-01-23 DIAGNOSIS — O24.913 DIABETES MELLITUS AFFECTING PREGNANCY IN THIRD TRIMESTER: ICD-10-CM

## 2017-01-23 DIAGNOSIS — Z34.93 PREGNANCY WITH ONE FETUS, THIRD TRIMESTER: Primary | ICD-10-CM

## 2017-01-23 PROCEDURE — 59025 FETAL NON-STRESS TEST: CPT | Mod: 26,S$PBB,, | Performed by: OBSTETRICS & GYNECOLOGY

## 2017-01-23 PROCEDURE — 99999 PR PBB SHADOW E&M-EST. PATIENT-LVL II: CPT | Mod: PBBFAC,,, | Performed by: OBSTETRICS & GYNECOLOGY

## 2017-01-23 PROCEDURE — 99212 OFFICE O/P EST SF 10 MIN: CPT | Mod: PBBFAC,25 | Performed by: OBSTETRICS & GYNECOLOGY

## 2017-01-23 PROCEDURE — 99213 OFFICE O/P EST LOW 20 MIN: CPT | Mod: 25,TH,S$PBB, | Performed by: OBSTETRICS & GYNECOLOGY

## 2017-01-23 PROCEDURE — 59025 FETAL NON-STRESS TEST: CPT | Mod: PBBFAC | Performed by: OBSTETRICS & GYNECOLOGY

## 2017-01-23 NOTE — MR AVS SNAPSHOT
[facility] - OB/ GYN  [address], [address]  [place] [address]  Phone: [phone]                  Isabel Curry   2017 2:20 PM   Routine Prenatal    Description:  Female : 1985   Provider:  Chavez Gonzales MD   Department:  [facility] - OB/ GYN           Reason for Visit     Routine Prenatal Visit                To Do List           Future Appointments        Provider Department Dept Phone    2017 2:20 PM Chavez Gonzales MD [facility] - OB/ -[phone]      Goals (5 Years of Data)     None      [facility]     [facility] -  Assistance  Registered nurses in the [facility] provide clinical advisement, health education, appointment booking, and other advisory services.  Call for this free service at [phone].             Medications           Message regarding Medications     Verify the changes and/or additions to your medication regime listed below are the same as discussed with your clinician today.  If any of these changes or additions are incorrect, please notify your healthcare provider.             Verify that the below list of medications is an accurate representation of the medications you are currently taking.  If none reported, the list may be blank. If incorrect, please contact your healthcare provider. Carry this list with you in case of emergency.           Current Medications     blood sugar diagnostic Strp 1 strip by Misc.(Non-Drug; Combo Route) route 4 (four) times daily as needed (fasting and 2 hours after each meals).    blood-glucose meter kit Use as instructed    docusate sodium (COLACE) 100 MG capsule Take 1 capsule (100 mg total) by mouth 2 (two) times daily as needed for Constipation.    EASY TOUCH LANCING DEVICE Misc test FOUR TIMES DAILY    ferrous gluconate (FERGON) 325 MG Tab Take 1 tablet (325 mg total) by mouth 2 (two) times daily with meals.    glyBURIDE (DIABETA) 2.5 MG tablet Take 0.5 tablets (1.25 mg  total) by mouth 2 (two) times daily with meals.    lancets 30 gauge Misc 1 lancet by Misc.(Non-Drug; Combo Route) route 4 (four) times daily as needed.    prenatal multivit-Ca-min-Fe-FA (PRENATAL VITAMIN) Tab Take 1 tablet by mouth once daily.    promethazine (PHENERGAN) 12.5 MG Tab Take 1 tablet (12.5 mg total) by mouth every 6 (six) hours as needed (nausea).    ranitidine (ZANTAC) 150 MG tablet Take 1 tablet (150 mg total) by mouth nightly as needed for Heartburn.           Clinical Reference Information           Prenatal Vitals     Enc. Date GA Prenatal Vitals Prenatal Pulse Pain Level Urine Albumin/Glucose Edema Presentation Dilation/Effacement/Station    1/23/17 36w4d 130/78 / 94.3 kg (208 lb 0.1 oz)    Negative / Negative       1/19/17 36w0d 132/72 / 93.9 kg (207 lb) 36 cm / 150 NST / Present 72 0 Negative / Trace None / None  0 / 0    1/16/17 35w4d 122/74 / 93 kg (205 lb) 36 cm / 145 NST / Present 60 0 Negative / Negative None / None      1/12/17 35w0d Admission Dept: [facility] L[facility]    1/12/17 35w0d 110/70 / 93.8 kg (206 lb 10.9 oz) 35 cm / 146 / Present 62 0 Negative / Negative None / None      1/9/17 34w4d 132/82 / 94.1 kg (207 lb 7.3 oz) 34 cm / 140 NST / Present 68 0 Trace / Negative None / None      1/5/17 34w0d 132/86 / 91.9 kg (202 lb 9.6 oz) 34 cm / 135 NST / Present 70 0 Negative / Negative None / None      1/2/17 33w4d Admission Dx: Normal pregnancy Dept: [facility] L[facility]    12/29/16 33w0d 127/81 / 92.6 kg (204 lb 2.3 oz) 34 cm / 130 NST / Present 64 0 Negative / Negative None / None      12/26/16 32w4d Admission Dx: Pregnancy Dept: [facility]    12/22/16 32w0d 124/77 / 90.7 kg (199 lb 15.3 oz) 33 cm / 135 NST / Present 72 0 Negative / Negative None / None      12/19/16 31w4d 120/70 / 92.2 kg (203 lb 4.2 oz) 32 cm / 150 NST / Present 60 0 Negative / Negative None / None      12/13/16 30w5d 123/76 / 90.9 kg (200 lb 6.4 oz) 31 cm / 142 / Present 68 0 Negative / Negative None / None      11/15/16 26w5d 120/70 / 89.9  kg (198 lb 3.1 oz) 26 cm / 146 / Present 70 0 Negative / Negative None / None      10/18/16 22w5d 110/74 / 88.2 kg (194 lb 8 oz)  / 150 / Present 64 0 Negative / Negative None / None      10/4/16 20w5d 121/76 / 87.5 kg (193 lb)  / 146 / Present 60 0 Negative / 1+ None / None      9/20/16 18w5d 120/80 / 86.6 kg (191 lb)  / 154 / Present 72 0 Negative / 1+ None / None      9/13/16 17w5d 119/79 / 86.6 kg (191 lb)  / 160 64 0 Negative / Negative None / None      8/16/16 13w5d 121/79 / 85.7 kg (189 lb)  / 158 70 0 Negative / Negative None / None  0 / 0      Vital Signs - Last Recorded  Most recent update: 1/23/2017 10:26 AM by Jamia Gonzalez MA    BP Wt LMP BMI       130/78 94.3 kg (208 lb 0.1 oz) 05/20/2016 (Exact Date) 33.57 kg/m2       Allergies as of 1/23/2017     No Known Allergies      Immunizations Administered on Date of Encounter - 1/23/2017     None      [facility] Sign-Up     Activating your [facility] account is as easy as 1-2-3!     1) Visit [email or web address], select Sign Up Now, enter this activation code and your date of birth, then select Next.  [email or web address]  Expires: 2/16/2017  3:29 PM      2) Create a username and password to use when you visit [facility] in the future and select a security question in case you lose your password and select Next.    3) Enter your e-mail address and click Sign Up!    Additional Information  If you have questions, please e-mail [email or web address] or call [phone] to talk to our [facility] staff. Remember, [facility] is NOT to be used for urgent needs. For medical emergencies, dial 911.

## 2017-01-24 NOTE — PROGRESS NOTES
36w4d with DM-type 2 on glyburide, gestational HTN, Hep B chronic carrier,  x 2.     Pt here for OB visit and NST secondary to GDM-A2 and gestational HTN.  No major omplaints.   Reports active fetus.  Denies vaginal bleeding, leakage of fluid, or contractions.  Denies headaches, vision changes, epigastric pain, or acute swelling.    Glucose log reviewed since last visit .  Fasting 61-90 and 2 hrs  (outlyier 202).  Continue glyburide to 1.25 mg qhs and 2.5 mg at qnoon.  Go to L&D if fasting > 120, 2hr > 160.     BP log reviewed since last visit.  Mostly normal to mild range, max 145/90.   Continue BP monitoring TID.  Go to L&D if BP > 150/95.  PreE precautions.    NST: Baseline 150, moderate variability, positive acceleration, no decelerations. [place]: No contraction.  Continue twice wkly NST until delivery.    Plan for delivery ~37-38 wks secondary to gestational HTN.  Pt requesting repeat  and BTL.    Labor/DM/preE precautions.  Kick counts TID.  Voiced understanding.

## 2017-01-26 ENCOUNTER — LAB VISIT (OUTPATIENT)
Dept: LAB | Facility: HOSPITAL | Age: 32
End: 2017-01-26
Attending: OBSTETRICS & GYNECOLOGY
Payer: MEDICAID

## 2017-01-26 ENCOUNTER — ROUTINE PRENATAL (OUTPATIENT)
Dept: OBSTETRICS AND GYNECOLOGY | Facility: CLINIC | Age: 32
End: 2017-01-26
Payer: MEDICAID

## 2017-01-26 VITALS
DIASTOLIC BLOOD PRESSURE: 76 MMHG | BODY MASS INDEX: 35.21 KG/M2 | SYSTOLIC BLOOD PRESSURE: 142 MMHG | WEIGHT: 218.13 LBS | HEART RATE: 66 BPM

## 2017-01-26 DIAGNOSIS — Z34.93 PREGNANCY WITH ONE FETUS, THIRD TRIMESTER: ICD-10-CM

## 2017-01-26 DIAGNOSIS — O13.3 GESTATIONAL HYPERTENSION, THIRD TRIMESTER: ICD-10-CM

## 2017-01-26 DIAGNOSIS — O24.913 DIABETES MELLITUS AFFECTING PREGNANCY IN THIRD TRIMESTER: ICD-10-CM

## 2017-01-26 DIAGNOSIS — Z34.93 PREGNANCY WITH ONE FETUS, THIRD TRIMESTER: Primary | ICD-10-CM

## 2017-01-26 DIAGNOSIS — Z98.891 H/O CESAREAN SECTION: ICD-10-CM

## 2017-01-26 LAB
BASOPHILS # BLD AUTO: 0.01 K/UL
BASOPHILS NFR BLD: 0.1 %
DIFFERENTIAL METHOD: ABNORMAL
EOSINOPHIL # BLD AUTO: 0.1 K/UL
EOSINOPHIL NFR BLD: 0.8 %
ERYTHROCYTE [DISTWIDTH] IN BLOOD BY AUTOMATED COUNT: 16.8 %
HCT VFR BLD AUTO: 32 %
HGB BLD-MCNC: 10.3 G/DL
LYMPHOCYTES # BLD AUTO: 1.4 K/UL
LYMPHOCYTES NFR BLD: 19.1 %
MCH RBC QN AUTO: 27.3 PG
MCHC RBC AUTO-ENTMCNC: 32.2 %
MCV RBC AUTO: 85 FL
MONOCYTES # BLD AUTO: 0.7 K/UL
MONOCYTES NFR BLD: 9.9 %
NEUTROPHILS # BLD AUTO: 5 K/UL
NEUTROPHILS NFR BLD: 70.1 %
PLATELET # BLD AUTO: 181 K/UL
PMV BLD AUTO: 10.6 FL
RBC # BLD AUTO: 3.77 M/UL
WBC # BLD AUTO: 7.16 K/UL

## 2017-01-26 PROCEDURE — 36415 COLL VENOUS BLD VENIPUNCTURE: CPT

## 2017-01-26 PROCEDURE — 59025 FETAL NON-STRESS TEST: CPT | Mod: 26,S$PBB,, | Performed by: OBSTETRICS & GYNECOLOGY

## 2017-01-26 PROCEDURE — 99999 PR PBB SHADOW E&M-EST. PATIENT-LVL II: CPT | Mod: PBBFAC,,, | Performed by: OBSTETRICS & GYNECOLOGY

## 2017-01-26 PROCEDURE — 99213 OFFICE O/P EST LOW 20 MIN: CPT | Mod: TH,S$PBB,25, | Performed by: OBSTETRICS & GYNECOLOGY

## 2017-01-26 PROCEDURE — 86703 HIV-1/HIV-2 1 RESULT ANTBDY: CPT

## 2017-01-26 PROCEDURE — 85025 COMPLETE CBC W/AUTO DIFF WBC: CPT

## 2017-01-26 RX ORDER — SODIUM CHLORIDE, SODIUM LACTATE, POTASSIUM CHLORIDE, CALCIUM CHLORIDE 600; 310; 30; 20 MG/100ML; MG/100ML; MG/100ML; MG/100ML
INJECTION, SOLUTION INTRAVENOUS CONTINUOUS
Status: CANCELLED | OUTPATIENT
Start: 2017-01-26

## 2017-01-26 RX ORDER — MISOPROSTOL 100 UG/1
600 TABLET ORAL
Status: CANCELLED | OUTPATIENT
Start: 2017-01-26

## 2017-01-26 RX ORDER — ONDANSETRON 4 MG/1
8 TABLET, ORALLY DISINTEGRATING ORAL EVERY 8 HOURS PRN
Status: CANCELLED | OUTPATIENT
Start: 2017-01-26

## 2017-01-26 NOTE — MR AVS SNAPSHOT
[facility] - OB/ GYN  [address], [address]  [place] [address]  Phone: [phone]                  Isabel Curry   2017 10:00 AM   Routine Prenatal    Description:  Female : 1985   Provider:  Chavez Gonzales MD   Department:  [facility] - OB/ GYN                To Do List           Future Appointments        Provider Department Dept Phone    2017 10:00 AM Chavez Gonzales MD [facility] OB/ -[phone]      Goals (5 Years of Data)     None      [facility]     [facility] Care [facility] -  Assistance  Registered nurses in the [facility] provide clinical advisement, health education, appointment booking, and other advisory services.  Call for this free service at [phone].             Medications           Message regarding Medications     Verify the changes and/or additions to your medication regime listed below are the same as discussed with your clinician today.  If any of these changes or additions are incorrect, please notify your healthcare provider.             Verify that the below list of medications is an accurate representation of the medications you are currently taking.  If none reported, the list may be blank. If incorrect, please contact your healthcare provider. Carry this list with you in case of emergency.           Current Medications     blood sugar diagnostic Strp 1 strip by Misc.(Non-Drug; Combo Route) route 4 (four) times daily as needed (fasting and 2 hours after each meals).    blood-glucose meter kit Use as instructed    docusate sodium (COLACE) 100 MG capsule Take 1 capsule (100 mg total) by mouth 2 (two) times daily as needed for Constipation.    EASY TOUCH LANCING DEVICE Misc test FOUR TIMES DAILY    ferrous gluconate (FERGON) 325 MG Tab Take 1 tablet (325 mg total) by mouth 2 (two) times daily with meals.    glyBURIDE (DIABETA) 2.5 MG tablet Take 0.5 tablets (1.25 mg total) by mouth 2 (two) times daily with meals.     lancets 30 gauge Misc 1 lancet by Misc.(Non-Drug; Combo Route) route 4 (four) times daily as needed.    prenatal multivit-Ca-min-Fe-FA (PRENATAL VITAMIN) Tab Take 1 tablet by mouth once daily.    promethazine (PHENERGAN) 12.5 MG Tab Take 1 tablet (12.5 mg total) by mouth every 6 (six) hours as needed (nausea).    ranitidine (ZANTAC) 150 MG tablet Take 1 tablet (150 mg total) by mouth nightly as needed for Heartburn.           Clinical Reference Information           Prenatal Vitals     Enc. Date GA Prenatal Vitals Prenatal Pulse Pain Level Urine Albumin/Glucose Edema Presentation Dilation/Effacement/Station    1/26/17 37w0d 120/82 / 98.9 kg (218 lb 2.3 oz)    Negative / Negative       1/23/17 36w4d 140/82 (A) / 94.3 kg (208 lb 0.1 oz) 37 cm / 150 NST / Present 70 0 Negative / Negative None / None  0 / 0    1/19/17 36w0d 132/72 / 93.9 kg (207 lb) 36 cm / 150 NST / Present 72 0 Negative / Trace None / None  0 / 0    1/16/17 35w4d 122/74 / 93 kg (205 lb) 36 cm / 145 NST / Present 60 0 Negative / Negative None / None      1/12/17 35w0d Admission Dept: [facility]    1/12/17 35w0d 110/70 / 93.8 kg (206 lb 10.9 oz) 35 cm / 146 / Present 62 0 Negative / Negative None / None      1/9/17 34w4d 132/82 / 94.1 kg (207 lb 7.3 oz) 34 cm / 140 NST / Present 68 0 Trace / Negative None / None      1/5/17 34w0d 132/86 / 91.9 kg (202 lb 9.6 oz) 34 cm / 135 NST / Present 70 0 Negative / Negative None / None      1/2/17 33w4d Admission Dx: Normal pregnancy Dept: [facility]    12/29/16 33w0d 127/81 / 92.6 kg (204 lb 2.3 oz) 34 cm / 130 NST / Present 64 0 Negative / Negative None / None      12/26/16 32w4d Admission Dx: Pregnancy Dept: [facility]    12/22/16 32w0d 124/77 / 90.7 kg (199 lb 15.3 oz) 33 cm / 135 NST / Present 72 0 Negative / Negative None / None      12/19/16 31w4d 120/70 / 92.2 kg (203 lb 4.2 oz) 32 cm / 150 NST / Present 60 0 Negative / Negative None / None      12/13/16 30w5d 123/76 / 90.9 kg (200 lb 6.4 oz) 31 cm / 142 /  Present 68 0 Negative / Negative None / None      11/15/16 26w5d 120/70 / 89.9 kg (198 lb 3.1 oz) 26 cm / 146 / Present 70 0 Negative / Negative None / None      10/18/16 22w5d 110/74 / 88.2 kg (194 lb 8 oz)  / 150 / Present 64 0 Negative / Negative None / None      10/4/16 20w5d 121/76 / 87.5 kg (193 lb)  / 146 / Present 60 0 Negative / 1+ None / None      9/20/16 18w5d 120/80 / 86.6 kg (191 lb)  / 154 / Present 72 0 Negative / 1+ None / None      9/13/16 17w5d 119/79 / 86.6 kg (191 lb)  / 160 64 0 Negative / Negative None / None      8/16/16 13w5d 121/79 / 85.7 kg (189 lb)  / 158 70 0 Negative / Negative None / None  0 / 0      Vital Signs - Last Recorded  Most recent update: 1/26/2017  9:35 AM by Jamia Gonzalez MA    BP Wt LMP BMI       120/82 98.9 kg (218 lb 2.3 oz) 05/20/2016 (Exact Date) 35.21 kg/m2       Allergies as of 1/26/2017     No Known Allergies      Immunizations Administered on Date of Encounter - 1/26/2017     None      [facility] Sign-Up     Activating your [facility] account is as easy as 1-2-3!     1) Visit [email or web address], select Sign Up Now, enter this activation code and your date of birth, then select Next.  [email or web address]  Expires: 2/16/2017  3:29 PM      2) Create a username and password to use when you visit [facility] in the future and select a security question in case you lose your password and select Next.    3) Enter your e-mail address and click Sign Up!    Additional Information  If you have questions, please e-mail [email or web address] or call [phone] to talk to our [facility] staff. Remember, [facility] is NOT to be used for urgent needs. For medical emergencies, dial 911.

## 2017-01-27 ENCOUNTER — ANESTHESIA EVENT (OUTPATIENT)
Dept: OBSTETRICS AND GYNECOLOGY | Facility: HOSPITAL | Age: 32
End: 2017-01-27
Payer: MEDICAID

## 2017-01-27 LAB — HIV 1+2 AB+HIV1 P24 AG SERPL QL IA: NEGATIVE

## 2017-01-27 NOTE — PROGRESS NOTES
"37w0d with DM-type 2 on glyburide, gestational HTN, Hep B chronic carrier,  x 2.     Here for OB visit and NST secondary to GDM-A2 and gestational HTN.  Pt has no major omplaints.   Reports active fetus.  Denies vaginal bleeding, leakage of fluid, or contractions.  Denies headaches, vision changes, epigastric pain, or acute swelling.    Pt did not bring her glucose or BP log.  Per her recall, fast < 95 and 2 hrs < 140.  Continue glyburide to 1.25 mg qhs and 2.5 mg at qnoon.  Go to L&D if fasting > 120, 2hr > 160.     Pt reports normal to mild range, "all BP <150/95".   Continue BP and glucose monitoring.  Go to L&D if BP > 150/95.    NST: Baseline 150, moderate variability, positive acceleration, no decelerations.   [place]: No contraction.    Plan for delivery ~37-38 wks secondary to gestational HTN.  Pt requesting repeat  and BTL scheduled for Monday.  Risks, benefits, and alternatives to  and BTL reviewed.    Labor/DM/preE precautions.  Kick counts TID.  Go any L&D for any concerns.  Voiced understanding.  "

## 2017-01-30 ENCOUNTER — ANESTHESIA (OUTPATIENT)
Dept: OBSTETRICS AND GYNECOLOGY | Facility: HOSPITAL | Age: 32
End: 2017-01-30
Payer: MEDICAID

## 2017-01-30 ENCOUNTER — HOSPITAL ENCOUNTER (INPATIENT)
Facility: HOSPITAL | Age: 32
LOS: 3 days | Discharge: HOME | End: 2017-02-02
Attending: OBSTETRICS & GYNECOLOGY | Admitting: OBSTETRICS & GYNECOLOGY
Payer: MEDICAID

## 2017-01-30 DIAGNOSIS — Z34.93 PREGNANCY WITH ONE FETUS, THIRD TRIMESTER: ICD-10-CM

## 2017-01-30 DIAGNOSIS — Z98.891 S/P CESAREAN SECTION: Primary | ICD-10-CM

## 2017-01-30 LAB
ABO + RH BLD: NORMAL
BASOPHILS # BLD AUTO: 0.01 K/UL
BASOPHILS NFR BLD: 0.2 %
BLD GP AB SCN CELLS X3 SERPL QL: NORMAL
DIFFERENTIAL METHOD: ABNORMAL
EOSINOPHIL # BLD AUTO: 0 K/UL
EOSINOPHIL NFR BLD: 0.7 %
ERYTHROCYTE [DISTWIDTH] IN BLOOD BY AUTOMATED COUNT: 16.6 %
GLUCOSE SERPL-MCNC: 81 MG/DL
HCT VFR BLD AUTO: 33 %
HGB BLD-MCNC: 10.5 G/DL
LYMPHOCYTES # BLD AUTO: 1.7 K/UL
LYMPHOCYTES NFR BLD: 29.7 %
MCH RBC QN AUTO: 27.1 PG
MCHC RBC AUTO-ENTMCNC: 31.8 %
MCV RBC AUTO: 85 FL
MONOCYTES # BLD AUTO: 0.6 K/UL
MONOCYTES NFR BLD: 10.4 %
NEUTROPHILS # BLD AUTO: 3.4 K/UL
NEUTROPHILS NFR BLD: 59 %
PLATELET # BLD AUTO: 187 K/UL
PMV BLD AUTO: 11 FL
POCT GLUCOSE: 78 MG/DL (ref 70–110)
RBC # BLD AUTO: 3.88 M/UL
WBC # BLD AUTO: 5.79 K/UL

## 2017-01-30 PROCEDURE — 25000003 PHARM REV CODE 250: Performed by: OBSTETRICS & GYNECOLOGY

## 2017-01-30 PROCEDURE — 86900 BLOOD TYPING SEROLOGIC ABO: CPT

## 2017-01-30 PROCEDURE — 63600175 PHARM REV CODE 636 W HCPCS: Performed by: OBSTETRICS & GYNECOLOGY

## 2017-01-30 PROCEDURE — 36000680 HC C/S TUBAL LIGATION LEVEL I

## 2017-01-30 PROCEDURE — 37000008 HC ANESTHESIA 1ST 15 MINUTES: Performed by: OBSTETRICS & GYNECOLOGY

## 2017-01-30 PROCEDURE — 63600175 PHARM REV CODE 636 W HCPCS: Performed by: NURSE ANESTHETIST, CERTIFIED REGISTERED

## 2017-01-30 PROCEDURE — 36415 COLL VENOUS BLD VENIPUNCTURE: CPT

## 2017-01-30 PROCEDURE — 85025 COMPLETE CBC W/AUTO DIFF WBC: CPT

## 2017-01-30 PROCEDURE — 4A1HX4Z MONITORING OF PRODUCTS OF CONCEPTION, CARDIAC ELECTRICAL ACTIVITY, EXTERNAL APPROACH: ICD-10-PCS | Performed by: OBSTETRICS & GYNECOLOGY

## 2017-01-30 PROCEDURE — 63600175 PHARM REV CODE 636 W HCPCS: Performed by: ANESTHESIOLOGY

## 2017-01-30 PROCEDURE — 0UL70CZ OCCLUSION OF BILATERAL FALLOPIAN TUBES WITH EXTRALUMINAL DEVICE, OPEN APPROACH: ICD-10-PCS | Performed by: OBSTETRICS & GYNECOLOGY

## 2017-01-30 PROCEDURE — 25000003 PHARM REV CODE 250: Performed by: NURSE ANESTHETIST, CERTIFIED REGISTERED

## 2017-01-30 PROCEDURE — 25000003 PHARM REV CODE 250: Performed by: ANESTHESIOLOGY

## 2017-01-30 PROCEDURE — 59514 CESAREAN DELIVERY ONLY: CPT | Mod: CRNA,,, | Performed by: NURSE ANESTHETIST, CERTIFIED REGISTERED

## 2017-01-30 PROCEDURE — 59514 CESAREAN DELIVERY ONLY: CPT | Mod: ANES,,, | Performed by: ANESTHESIOLOGY

## 2017-01-30 PROCEDURE — 86850 RBC ANTIBODY SCREEN: CPT

## 2017-01-30 PROCEDURE — 27200688 HC TRAY, SPINAL-HYPER/ ISOBARIC: Performed by: NURSE ANESTHETIST, CERTIFIED REGISTERED

## 2017-01-30 PROCEDURE — 51702 INSERT TEMP BLADDER CATH: CPT

## 2017-01-30 PROCEDURE — 58611 LIGATE OVIDUCT(S) ADD-ON: CPT | Mod: 80,,, | Performed by: OBSTETRICS & GYNECOLOGY

## 2017-01-30 PROCEDURE — 59514 CESAREAN DELIVERY ONLY: CPT | Mod: AT,80,, | Performed by: OBSTETRICS & GYNECOLOGY

## 2017-01-30 PROCEDURE — 11000001 HC ACUTE MED/SURG PRIVATE ROOM

## 2017-01-30 PROCEDURE — 82947 ASSAY GLUCOSE BLOOD QUANT: CPT

## 2017-01-30 PROCEDURE — 27100025 HC TUBING, SET FLUID WARMER: Performed by: NURSE ANESTHETIST, CERTIFIED REGISTERED

## 2017-01-30 PROCEDURE — 37000009 HC ANESTHESIA EA ADD 15 MINS: Performed by: OBSTETRICS & GYNECOLOGY

## 2017-01-30 PROCEDURE — 58611 LIGATE OVIDUCT(S) ADD-ON: CPT | Mod: ,,, | Performed by: OBSTETRICS & GYNECOLOGY

## 2017-01-30 PROCEDURE — 36000685 HC CESAREAN SECTION LEVEL I

## 2017-01-30 PROCEDURE — 59514 CESAREAN DELIVERY ONLY: CPT | Mod: AT,,, | Performed by: OBSTETRICS & GYNECOLOGY

## 2017-01-30 RX ORDER — SODIUM CITRATE AND CITRIC ACID MONOHYDRATE 334; 500 MG/5ML; MG/5ML
30 SOLUTION ORAL ONCE
Status: COMPLETED | OUTPATIENT
Start: 2017-01-30 | End: 2017-01-30

## 2017-01-30 RX ORDER — OXYTOCIN/RINGER'S LACTATE 20/1000 ML
41.65 PLASTIC BAG, INJECTION (ML) INTRAVENOUS CONTINUOUS
Status: DISPENSED | OUTPATIENT
Start: 2017-01-30 | End: 2017-01-31

## 2017-01-30 RX ORDER — PHENYLEPHRINE HYDROCHLORIDE 10 MG/ML
INJECTION INTRAVENOUS
Status: DISCONTINUED | OUTPATIENT
Start: 2017-01-30 | End: 2017-01-30

## 2017-01-30 RX ORDER — KETOROLAC TROMETHAMINE 30 MG/ML
30 INJECTION, SOLUTION INTRAMUSCULAR; INTRAVENOUS EVERY 6 HOURS
Status: COMPLETED | OUTPATIENT
Start: 2017-01-30 | End: 2017-01-31

## 2017-01-30 RX ORDER — OXYCODONE AND ACETAMINOPHEN 10; 325 MG/1; MG/1
1 TABLET ORAL EVERY 4 HOURS PRN
Status: DISCONTINUED | OUTPATIENT
Start: 2017-01-30 | End: 2017-02-02 | Stop reason: HOSPADM

## 2017-01-30 RX ORDER — DIPHENHYDRAMINE HYDROCHLORIDE 50 MG/ML
12.5 INJECTION INTRAMUSCULAR; INTRAVENOUS EVERY 4 HOURS PRN
Status: CANCELLED | OUTPATIENT
Start: 2017-01-30

## 2017-01-30 RX ORDER — HYDROMORPHONE HCL IN 0.9% NACL 6 MG/30 ML
PATIENT CONTROLLED ANALGESIA SYRINGE INTRAVENOUS CONTINUOUS
Status: DISCONTINUED | OUTPATIENT
Start: 2017-01-30 | End: 2017-01-30

## 2017-01-30 RX ORDER — SODIUM CHLORIDE, SODIUM LACTATE, POTASSIUM CHLORIDE, CALCIUM CHLORIDE 600; 310; 30; 20 MG/100ML; MG/100ML; MG/100ML; MG/100ML
INJECTION, SOLUTION INTRAVENOUS CONTINUOUS
Status: DISCONTINUED | OUTPATIENT
Start: 2017-01-30 | End: 2017-02-02

## 2017-01-30 RX ORDER — ONDANSETRON HYDROCHLORIDE 2 MG/ML
INJECTION, SOLUTION INTRAMUSCULAR; INTRAVENOUS
Status: DISCONTINUED | OUTPATIENT
Start: 2017-01-30 | End: 2017-01-30

## 2017-01-30 RX ORDER — MISOPROSTOL 200 UG/1
600 TABLET ORAL
Status: DISCONTINUED | OUTPATIENT
Start: 2017-01-30 | End: 2017-02-02 | Stop reason: HOSPADM

## 2017-01-30 RX ORDER — FAMOTIDINE 10 MG/ML
20 INJECTION INTRAVENOUS ONCE
Status: COMPLETED | OUTPATIENT
Start: 2017-01-30 | End: 2017-01-30

## 2017-01-30 RX ORDER — ONDANSETRON 2 MG/ML
4 INJECTION INTRAMUSCULAR; INTRAVENOUS EVERY 12 HOURS PRN
Status: CANCELLED | OUTPATIENT
Start: 2017-01-30

## 2017-01-30 RX ORDER — DOCUSATE SODIUM 100 MG/1
200 CAPSULE, LIQUID FILLED ORAL 2 TIMES DAILY
Status: DISCONTINUED | OUTPATIENT
Start: 2017-01-30 | End: 2017-02-02 | Stop reason: HOSPADM

## 2017-01-30 RX ORDER — ONDANSETRON 2 MG/ML
4 INJECTION INTRAMUSCULAR; INTRAVENOUS EVERY 12 HOURS PRN
Status: DISCONTINUED | OUTPATIENT
Start: 2017-01-30 | End: 2017-01-30

## 2017-01-30 RX ORDER — SODIUM CHLORIDE, SODIUM LACTATE, POTASSIUM CHLORIDE, CALCIUM CHLORIDE 600; 310; 30; 20 MG/100ML; MG/100ML; MG/100ML; MG/100ML
INJECTION, SOLUTION INTRAVENOUS CONTINUOUS PRN
Status: DISCONTINUED | OUTPATIENT
Start: 2017-01-30 | End: 2017-01-30

## 2017-01-30 RX ORDER — SIMETHICONE 80 MG
1 TABLET,CHEWABLE ORAL EVERY 6 HOURS PRN
Status: DISCONTINUED | OUTPATIENT
Start: 2017-01-30 | End: 2017-02-02 | Stop reason: HOSPADM

## 2017-01-30 RX ORDER — DIPHENHYDRAMINE HCL 25 MG
25 CAPSULE ORAL EVERY 4 HOURS PRN
Status: DISCONTINUED | OUTPATIENT
Start: 2017-01-30 | End: 2017-02-02 | Stop reason: HOSPADM

## 2017-01-30 RX ORDER — ONDANSETRON 8 MG/1
8 TABLET, ORALLY DISINTEGRATING ORAL EVERY 8 HOURS PRN
Status: CANCELLED | OUTPATIENT
Start: 2017-01-30

## 2017-01-30 RX ORDER — AMOXICILLIN 250 MG
1 CAPSULE ORAL NIGHTLY PRN
Status: DISCONTINUED | OUTPATIENT
Start: 2017-01-30 | End: 2017-02-02 | Stop reason: HOSPADM

## 2017-01-30 RX ORDER — FAMOTIDINE 10 MG/ML
20 INJECTION INTRAVENOUS ONCE
Status: CANCELLED | OUTPATIENT
Start: 2017-01-30 | End: 2017-01-30

## 2017-01-30 RX ORDER — DIPHENHYDRAMINE HYDROCHLORIDE 50 MG/ML
12.5 INJECTION INTRAMUSCULAR; INTRAVENOUS EVERY 4 HOURS PRN
Status: DISCONTINUED | OUTPATIENT
Start: 2017-01-30 | End: 2017-01-30

## 2017-01-30 RX ORDER — MIDAZOLAM HYDROCHLORIDE 1 MG/ML
INJECTION INTRAMUSCULAR; INTRAVENOUS
Status: DISCONTINUED | OUTPATIENT
Start: 2017-01-30 | End: 2017-01-30

## 2017-01-30 RX ORDER — BISACODYL 10 MG
10 SUPPOSITORY, RECTAL RECTAL ONCE AS NEEDED
Status: ACTIVE | OUTPATIENT
Start: 2017-01-30 | End: 2017-01-30

## 2017-01-30 RX ORDER — METOCLOPRAMIDE HYDROCHLORIDE 5 MG/ML
10 INJECTION INTRAMUSCULAR; INTRAVENOUS ONCE
Status: COMPLETED | OUTPATIENT
Start: 2017-01-30 | End: 2017-01-30

## 2017-01-30 RX ORDER — ONDANSETRON 8 MG/1
8 TABLET, ORALLY DISINTEGRATING ORAL EVERY 8 HOURS PRN
Status: DISCONTINUED | OUTPATIENT
Start: 2017-01-30 | End: 2017-02-02 | Stop reason: HOSPADM

## 2017-01-30 RX ORDER — KETOROLAC TROMETHAMINE 30 MG/ML
INJECTION, SOLUTION INTRAMUSCULAR; INTRAVENOUS
Status: DISCONTINUED | OUTPATIENT
Start: 2017-01-30 | End: 2017-01-30

## 2017-01-30 RX ORDER — NALOXONE HCL 0.4 MG/ML
0.02 VIAL (ML) INJECTION
Status: CANCELLED | OUTPATIENT
Start: 2017-01-30

## 2017-01-30 RX ORDER — OXYTOCIN 10 [USP'U]/ML
INJECTION, SOLUTION INTRAMUSCULAR; INTRAVENOUS
Status: DISCONTINUED | OUTPATIENT
Start: 2017-01-30 | End: 2017-01-30

## 2017-01-30 RX ORDER — METOCLOPRAMIDE HYDROCHLORIDE 5 MG/ML
10 INJECTION INTRAMUSCULAR; INTRAVENOUS ONCE
Status: CANCELLED | OUTPATIENT
Start: 2017-01-30 | End: 2017-01-30

## 2017-01-30 RX ORDER — CEFAZOLIN SODIUM 2 G/50ML
2 SOLUTION INTRAVENOUS ONCE
Status: COMPLETED | OUTPATIENT
Start: 2017-01-30 | End: 2017-01-30

## 2017-01-30 RX ORDER — FENTANYL CITRATE 50 UG/ML
INJECTION, SOLUTION INTRAMUSCULAR; INTRAVENOUS
Status: DISCONTINUED | OUTPATIENT
Start: 2017-01-30 | End: 2017-01-30

## 2017-01-30 RX ORDER — HYDROMORPHONE HCL IN 0.9% NACL 6 MG/30 ML
PATIENT CONTROLLED ANALGESIA SYRINGE INTRAVENOUS CONTINUOUS
Status: CANCELLED | OUTPATIENT
Start: 2017-01-30

## 2017-01-30 RX ORDER — GLYCOPYRROLATE 0.2 MG/ML
INJECTION INTRAMUSCULAR; INTRAVENOUS
Status: DISCONTINUED | OUTPATIENT
Start: 2017-01-30 | End: 2017-01-30

## 2017-01-30 RX ORDER — HYDROCORTISONE 25 MG/G
CREAM TOPICAL 3 TIMES DAILY PRN
Status: DISCONTINUED | OUTPATIENT
Start: 2017-01-30 | End: 2017-02-02 | Stop reason: HOSPADM

## 2017-01-30 RX ORDER — OXYCODONE AND ACETAMINOPHEN 5; 325 MG/1; MG/1
1 TABLET ORAL EVERY 4 HOURS PRN
Status: DISCONTINUED | OUTPATIENT
Start: 2017-01-30 | End: 2017-02-02 | Stop reason: HOSPADM

## 2017-01-30 RX ORDER — SODIUM CITRATE AND CITRIC ACID MONOHYDRATE 334; 500 MG/5ML; MG/5ML
30 SOLUTION ORAL ONCE
Status: CANCELLED | OUTPATIENT
Start: 2017-01-30 | End: 2017-01-30

## 2017-01-30 RX ORDER — NALOXONE HCL 0.4 MG/ML
0.02 VIAL (ML) INJECTION
Status: DISCONTINUED | OUTPATIENT
Start: 2017-01-30 | End: 2017-01-30

## 2017-01-30 RX ORDER — SODIUM CHLORIDE, SODIUM LACTATE, POTASSIUM CHLORIDE, CALCIUM CHLORIDE 600; 310; 30; 20 MG/100ML; MG/100ML; MG/100ML; MG/100ML
INJECTION, SOLUTION INTRAVENOUS CONTINUOUS
Status: ACTIVE | OUTPATIENT
Start: 2017-01-30 | End: 2017-01-31

## 2017-01-30 RX ORDER — ADHESIVE BANDAGE
30 BANDAGE TOPICAL 2 TIMES DAILY PRN
Status: DISCONTINUED | OUTPATIENT
Start: 2017-01-31 | End: 2017-02-02 | Stop reason: HOSPADM

## 2017-01-30 RX ORDER — IBUPROFEN 400 MG/1
800 TABLET ORAL EVERY 8 HOURS
Status: DISCONTINUED | OUTPATIENT
Start: 2017-01-31 | End: 2017-02-02 | Stop reason: HOSPADM

## 2017-01-30 RX ADMIN — SODIUM CHLORIDE, SODIUM LACTATE, POTASSIUM CHLORIDE, AND CALCIUM CHLORIDE: .6; .31; .03; .02 INJECTION, SOLUTION INTRAVENOUS at 12:01

## 2017-01-30 RX ADMIN — OXYTOCIN 5 UNITS: 10 INJECTION, SOLUTION INTRAMUSCULAR; INTRAVENOUS at 01:01

## 2017-01-30 RX ADMIN — METOCLOPRAMIDE 10 MG: 5 INJECTION, SOLUTION INTRAMUSCULAR; INTRAVENOUS at 12:01

## 2017-01-30 RX ADMIN — KETOROLAC TROMETHAMINE 30 MG: 30 INJECTION, SOLUTION INTRAMUSCULAR at 05:01

## 2017-01-30 RX ADMIN — OXYTOCIN 20 UNITS: 10 INJECTION, SOLUTION INTRAMUSCULAR; INTRAVENOUS at 01:01

## 2017-01-30 RX ADMIN — PHENYLEPHRINE HYDROCHLORIDE 100 MCG: 10 INJECTION INTRAVENOUS at 01:01

## 2017-01-30 RX ADMIN — Medication: at 03:01

## 2017-01-30 RX ADMIN — KETOROLAC TROMETHAMINE 30 MG: 30 INJECTION, SOLUTION INTRAMUSCULAR at 11:01

## 2017-01-30 RX ADMIN — SODIUM CHLORIDE, SODIUM LACTATE, POTASSIUM CHLORIDE, AND CALCIUM CHLORIDE 1000 ML: .6; .31; .03; .02 INJECTION, SOLUTION INTRAVENOUS at 10:01

## 2017-01-30 RX ADMIN — MIDAZOLAM HYDROCHLORIDE 1 MG: 1 INJECTION, SOLUTION INTRAMUSCULAR; INTRAVENOUS at 01:01

## 2017-01-30 RX ADMIN — SODIUM CITRATE AND CITRIC ACID MONOHYDRATE 30 ML: 500; 334 SOLUTION ORAL at 12:01

## 2017-01-30 RX ADMIN — OXYTOCIN 25 UNITS: 10 INJECTION, SOLUTION INTRAMUSCULAR; INTRAVENOUS at 01:01

## 2017-01-30 RX ADMIN — GLYCOPYRROLATE 0.2 MG: 0.2 INJECTION, SOLUTION INTRAMUSCULAR; INTRAVENOUS at 01:01

## 2017-01-30 RX ADMIN — FAMOTIDINE 20 MG: 10 INJECTION, SOLUTION INTRAVENOUS at 12:01

## 2017-01-30 RX ADMIN — PHENYLEPHRINE HYDROCHLORIDE 200 MCG: 10 INJECTION INTRAVENOUS at 01:01

## 2017-01-30 RX ADMIN — Medication 20 MG: at 01:01

## 2017-01-30 RX ADMIN — SODIUM CHLORIDE, SODIUM LACTATE, POTASSIUM CHLORIDE, AND CALCIUM CHLORIDE: .6; .31; .03; .02 INJECTION, SOLUTION INTRAVENOUS at 01:01

## 2017-01-30 RX ADMIN — KETOROLAC TROMETHAMINE 30 MG: 30 INJECTION, SOLUTION INTRAMUSCULAR; INTRAVENOUS at 01:01

## 2017-01-30 RX ADMIN — CEFAZOLIN SODIUM 2 G: 2 SOLUTION INTRAVENOUS at 12:01

## 2017-01-30 RX ADMIN — ONDANSETRON 4 MG: 2 INJECTION, SOLUTION INTRAMUSCULAR; INTRAVENOUS at 01:01

## 2017-01-30 RX ADMIN — Medication 41.65 MILLI-UNITS/MIN: at 05:01

## 2017-01-30 RX ADMIN — PHENYLEPHRINE HYDROCHLORIDE 400 MCG: 10 INJECTION INTRAVENOUS at 01:01

## 2017-01-30 RX ADMIN — DOCUSATE SODIUM 200 MG: 100 CAPSULE, LIQUID FILLED ORAL at 09:01

## 2017-01-30 RX ADMIN — FENTANYL CITRATE 10 MCG: 50 INJECTION, SOLUTION INTRAMUSCULAR; INTRAVENOUS at 12:01

## 2017-01-30 RX ADMIN — FENTANYL CITRATE 90 MCG: 50 INJECTION, SOLUTION INTRAMUSCULAR; INTRAVENOUS at 01:01

## 2017-01-30 NOTE — TRANSFER OF CARE
"Anesthesia Transfer of Care Note    Patient: Isabel Curry    Procedure(s) Performed: Procedure(s) (LRB):  DELIVERY- SECTION WITH TUBAL (N/A)    Patient location: Labor and Delivery    Anesthesia Type: spinal    Transport from OR: Transported from OR on room air with adequate spontaneous ventilation    Post pain: adequate analgesia    Post assessment: no apparent anesthetic complications and tolerated procedure well    Post vital signs: stable    Level of consciousness: awake, alert and oriented    Nausea/Vomiting: no nausea/vomiting    Complications: none          Last vitals:   Visit Vitals    BP (!) 145/69 (BP Location: Left arm, Patient Position: Lying, BP Method: Automatic)    Pulse (!) 124    Temp 36.3 °C (97.3 °F) (Oral)    Resp 18    Ht 5' 6" (1.676 m)    Wt 93.9 kg (207 lb)    LMP 2016 (Exact Date)    SpO2 100%    Breastfeeding No    BMI 33.41 kg/m2     "

## 2017-01-30 NOTE — L&D DELIVERY NOTE
[facility]   Operative Report  Obstetrics & Gynecology      Date of Surgery:  2017     Surgeon:  Chavez Gonzales MD     Assistant:  Davonte Fried MD      Preoperative diagnosis:     To IUP at 37w4d for repeat  secondary to gestational HTN  Declined trail of labor after   Diabetes mellitus, type 2 on glyburide   Multiparity, desires permanent sterilization     Postoperative diagnosis:     To IUP at 37w4d s/p repeat  section and bilateral tubal ligation  Live infant  Gestational hypertension  Diabetes mellitus, type 2 on glyburide     Procedure performed:      Primary low transverse  section via pfannenstiel skin incision   Bilateral tubal ligation via Filshie clips       Anesthesia:  Spinal      IV Fluids:  1500 mL of LR     Urine Output:  100 mL, clear at end of procedure       Estimated Blood Loss:  700 mL with no replacements     Specimens:  Cord blood    Findings:      Live female infant, APGAR 1 min: 9, 5 min: 9, transfer to well-born nursery  Weight 8 lb 7 oz (3827 g)  AROM at time of uterine incision with moderate, clear fluid, no odor  Grossly normal uterus, tubes and ovaries     Complications:  None    Indications for the Procedure:      See H&P for complete details.  In brief, Isabel Curry is a 31 y.o.  at 37w4d gestation who is here for a scheduled repeat  delivery secondary to gestational hypertension and declined trail of labor after  to the avoid risk of uterine rupture.  Pt also requesting bilateral tubal ligation.  Maternal and fetal status was overall reassuring.  Pt was informed of the risks, benefits, alternatives and possible complications to  and blood transfusion including pre-admission, admission, and post-admission procedures and expectations.  Risks of  section included, but were not limited to, death, urinary and/or fecal incontinence, injury to bladder and/or urinary tract,  injury to bowel and/or intestinal obstruction, risk of infection, damage to major blood vessels, hemorrhage requiring transfusion of blood products and/or hysterectomy, painful intercourse, ovarian failure requiring hormone administration, pulmonary embolism, fistula formation, sexual dysfunction, hernia, failure of wound to heal, permanent and disfiguring scarring.  In the event of a hysterectomy +/- bilateral salpingo-oophorectomy (BS&O) if uterine/ovarian injury or uncontrollable hemorrhage were to occur, the patient was counseled extensively on the inability to become pregnant following a hysterectomy and in the event of a BS&O will result in surgical menopause.  In addition, the patient was informed of the risks, benefits, alternatives and possible complications to bilateral tubal ligation including but not limited to risk of failure with increased risk of ectopic gestation if pregnancy occurs.  All of the patients questions were answered to her satisfaction.  She voiced understanding and acceptance of these risks.  Informed consent was obtained for  delivery, bilateral tubal ligation, blood transfusions and all indicated procedures.     Description of the Procedure:      The patient was taken to the operating room where a time out was performed to confirm the correct patient and correct procedure.  Spinal anesthesia was obtained without difficulty.  Pt was then prepped and draped in a normal sterile fashion in the dorsal supine position with a leftward tilt.  A Pfannenstiel skin incision was then made with the scalpel and carried through to the underlying layer of fascia with the Bovie.  The fascia was then incised in the midline and the incision extended laterally with Fernando scissors.  The superior aspect of the fascia was then grasped with Kocher clamps, elevated and the underlying rectus muscles were dissected off bluntly.  Attention was then turned to the inferior aspect of the fascial incision  which, in a similar fashion, was grasped, tented up with Kocher clamps, and the rectus muscles were dissected off bluntly.  The rectus muscles were then  in the midline, and the peritoneum identified, tented up, and entered sharply with Metzenbaum scissors.  The peritoneal incision was then extended superiorly and inferiorly with good visualization of the bladder.  A low transverse uterine incision was made well above the bladder reflection with the scalpel.  The uterine incision was extended cephalo-caudad fashion and the infant's head delivered atraumatically followed by the remainder of the body without difficulty.  The mouth and nose were suctioned and the cord clamped and cut.  The infant was vigorous with a strong cry and handed to the awaiting NICU NNP.  Cord blood was obtained.  The placenta was removed spontaneously; the uterus was exteriorized and cleared of all clots and debris.  The uterine incision was repaired with 1-0 Monocryl in a running, locked fashion.  A second layer of the same suture was used to obtain excellent hemostasis.  The uterus was returned to the abdomen. The gutters were cleaned of all clots and debris.  Hemostasis was noted.  The rectus muscles were reapproximated with 2-0 chromic.  The fascia was reapproximated with 0 vicryl in a running fashion.  The subcutaneous fat was reapproximated with 2-0 plain gut.  The skin was closed with absorbable Insorb staples.  The patient tolerated the procedure well.  Sponge, lap and needle counts were correct time two.  Two grams of Ancef was given prior to the procedure.  The patient was transferred to the L&D recovery room in stable condition.  Findings and expectations were discussed with the patient.  All of her questions were answered to her satisfaction and she voiced understanding.       Chavez Gonzales MD

## 2017-01-30 NOTE — NURSING
Transferred to 241 per stretcher. Pericare done. LISETTE Pena @ bs for bedside handoff report & PCA handoff. Accucheck done poct, 78. Aragon emptied 500 cc of clear, pale, yellow urine per gu bag. Pads changed.

## 2017-01-30 NOTE — IP AVS SNAPSHOT
[place] [address] [place] [address]  Phone: [phone]           Patient Discharge Instructions     Our goal is to set you up for success. This packet includes information on your condition, medications, and your home care. It will help you to care for yourself so you don't get sicker and need to go back to the hospital.     Please ask your nurse if you have any questions.        There are many details to remember when preparing to leave the hospital. Here is what you will need to do:    1. Take your medicine. If you are prescribed medications, review your Medication List in the following pages. You may have new medications to  at the pharmacy and others that you'll need to stop taking. Review the instructions for how and when to take your medications. Talk with your doctor or nurses if you are unsure of what to do.     2. Go to your follow-up appointments. Specific follow-up information is listed in the following pages. Your may be contacted by a transition nurse or clinical provider about future appointments. Be sure we have all of the phone numbers to reach you, if needed. Please contact your provider's office if you are unable to make an appointment.     3. Watch for warning signs. Your doctor or nurse will give you detailed warning signs to watch for and when to call for assistance. These instructions may also include educational information about your condition. If you experience any of warning signs to your health, call your doctor.               [facility]  Unless otherwise directed by your provider, please contact [facility], our nurse care line that is available for 24/7 assistance.     [phone] (toll-free)    Registered nurses in the [facility] provide clinical advisement, health education, appointment booking, and other advisory services.                    ** Verify the list of medication(s) below is accurate and up to date.  Carry this with you in case of emergency. If your medications have changed, please notify your healthcare provider.             Medication List      START taking these medications        Additional Info                      ibuprofen 600 MG tablet   Commonly known as:  ADVIL,MOTRIN   Quantity:  60 tablet   Refills:  0   Dose:  600 mg    Last time this was given:  800 mg on 2/2/2017  5:55 AM   Instructions:  Take 1 tablet (600 mg total) by mouth every 6 (six) hours as needed for Pain.     Begin Date    AM    Noon    PM    Bedtime       oxycodone-acetaminophen 5-325 mg per tablet   Commonly known as:  PERCOCET   Quantity:  30 tablet   Refills:  0   Dose:  1 tablet    Instructions:  Take 1 tablet by mouth every 4 (four) hours as needed for Pain.     Begin Date    AM    Noon    PM    Bedtime         CONTINUE taking these medications        Additional Info                      blood sugar diagnostic Strp   Quantity:  100 strip   Refills:  11   Dose:  1 strip    Instructions:  1 strip by Misc.(Non-Drug; Combo Route) route 4 (four) times daily as needed (fasting and 2 hours after each meals).     Begin Date    AM    Noon    PM    Bedtime       blood-glucose meter kit   Quantity:  1 each   Refills:  0   Comments:  Please dispense testing supplies covered by her insurance.    Instructions:  Use as instructed     Begin Date    AM    Noon    PM    Bedtime       docusate sodium 100 MG capsule   Commonly known as:  COLACE   Quantity:  60 capsule   Refills:  1   Dose:  100 mg    Last time this was given:  200 mg on 2/2/2017  8:43 AM   Instructions:  Take 1 capsule (100 mg total) by mouth 2 (two) times daily as needed for Constipation.     Begin Date    AM    Noon    PM    Bedtime       EASY TOUCH LANCING DEVICE Misc   Refills:  0   Generic drug:  lancing device    Instructions:  test FOUR TIMES DAILY     Begin Date    AM    Noon    PM    Bedtime       ferrous gluconate 325 MG Tab   Commonly known as:  FERGON   Quantity:  60  tablet   Refills:  1   Dose:  325 mg    Instructions:  Take 1 tablet (325 mg total) by mouth 2 (two) times daily with meals.     Begin Date    AM    Noon    PM    Bedtime       lancets 30 gauge Misc   Quantity:  100 each   Refills:  11   Dose:  1 lancet   Comments:  Please dispense testing supplies covered by her insurance.    Instructions:  1 lancet by Misc.(Non-Drug; Combo Route) route 4 (four) times daily as needed.     Begin Date    AM    Noon    PM    Bedtime       prenatal multivit-Ca-min-Fe-FA Tab   Commonly known as:  PRENATAL VITAMIN   Quantity:  30 each   Refills:  11   Dose:  1 tablet   Comments:  Please substitute for a comparable prenatal vitamins covered by patient's insurance plan affordable to patient. Also, dispense a 90 days supply when possible if requested by patient. Thanks.    Instructions:  Take 1 tablet by mouth once daily.     Begin Date    AM    Noon    PM    Bedtime       ranitidine 150 MG tablet   Commonly known as:  ZANTAC   Quantity:  30 tablet   Refills:  1   Dose:  150 mg    Instructions:  Take 1 tablet (150 mg total) by mouth nightly as needed for Heartburn.     Begin Date    AM    Noon    PM    Bedtime         STOP taking these medications     glyBURIDE 2.5 MG tablet   Commonly known as:  DIABETA       promethazine 12.5 MG Tab   Commonly known as:  PHENERGAN            Where to Get Your Medications      These medications were sent to [facility] [address] [place] [address]     Phone:  [phone]     ibuprofen 600 MG tablet    oxycodone-acetaminophen 5-325 mg per tablet                  Please bring to all follow up appointments:    1. A copy of your discharge instructions.  2. All medicines you are currently taking in their original bottles.  3. Identification and insurance card.    Please arrive 15 minutes ahead of scheduled appointment time.    Please call 24 hours in advance if you must reschedule your  appointment and/or time.        Follow-up Information     Follow up with Chavez Gonzales MD. Schedule an appointment as soon as possible for a visit in 2 weeks.    Specialty:  Obstetrics and Gynecology    Why:  Post-op visit    Contact information:    [address] [place] [address]  [place] [address]  [phone]            Discharge Instructions       After a Cesearean Birth    General Discharge Instructions  · May follow a regular diet, unless otherwise discussed with physician.  · Take showers, not baths unless otherwise discussed with physician.  · Activity as tolerated.  · No lifting or heavy exercise for 6 weeks, no driving for 2 weeks, no sexual intercourse, douching or tampons for 6 weeks  · May return to work/school as discussed with physician  · Discuss birth control with physician  · Breast care support bra worn at all times  · Lactation consultant referral number ( [phone] or [phone])    Call Your Healthcare Provider Right Away If You Have:  · A fever of 101°F or higher.  · Incisional drainage  · Heavy vaginal bleeding, clots, or vaginal discharge with foul odor.  · Redness, discharge, or pain worse than you had in the hospital.  · Burning, pain, red streaks, or lumpy areas in your breasts.  · Burning or pain when you urinate.  · Persistent nausea or vomiting.  · Severe headaches, blurred vision, dizziness or fainting.  · Feelings of extreme sadness or anxiety, or a feeling that you dont want to be with your baby.  · No bowel movement for 5 days.  · Redness, warmth, swelling, or pain in the lower leg.    Incision Care  · You will be able to shower and pat the incision dry.  · Watch your incision for signs of infection, such as increasing redness or drainage.  · For ease of movement, hold a pillow against the incision when you get up from a lying or sitting position, and when you laugh or cough.  · Avoid heavy lifting--nothing heavier than your baby until your doctor instructs you  "otherwise.   Follow-Up  Schedule a  follow-up exam with your healthcare provider for about 6 weeks after delivery. During this exam, your uterus and vaginal area will be checked. Contact your healthcare provider if you think you or your baby are having any problems.            Discharge References/Attachments     , AFTER (ENGLISH)    BLOOD SUGAR, EXERCISE TO MANAGE YOUR (ENGLISH)    DIABETES, DIET (ENGLISH)    DIABETES, CARBOHYDRATES, FATS, AND PROTEIN, UNDERSTANDING (ENGLISH)    PREECLAMPSIA, UNDERSTANDING (ENGLISH)    BLOOD PRESSURE, DISCHARGE INSTRUCTIONS: TAKING YOUR (ENGLISH)    HEPATITIS B (HBV), UNDERSTANDING (ENGLISH)    AFTER DELIVERY: WHEN TO CALL THE HEALTH CARE PROVIDER (ENGLISH)        Primary Diagnosis     Your primary diagnosis was:  Pregnancy With One Fetus      Admission Information     Date & Time Provider Department CSN    2017  9:14 AM Chavez Gonzales MD [facility] [id number]      Care Providers     Provider Role Specialty Primary office phone    Chavez Gonzales MD Attending Provider Obstetrics and Gynecology [phone]    Chavez Gonzales MD Surgeon  Obstetrics and Gynecology [phone]      Your Vitals Were     BP Pulse Temp Resp Height Weight    137/86 91 97.3 °F (36.3 °C) 20 5' 6" (1.676 m) 93.9 kg (207 lb)    Last Period SpO2 BMI          2016 (Exact Date) 100% 33.41 kg/m2        Recent Lab Values        2013 2016 11/15/2016                     7:00 PM  9:40 AM 10:17 AM         A1C 6.5 (H) 6.3 (H) 5.9         Comment for A1C at  9:40 AM on 2016:  According to ADA guidelines, hemoglobin A1C <7.0% represents  optimal control in non-pregnant diabetic patients.  Different  metrics may apply to specific populations.   Standards of Medical Care in Diabetes - 2016.  For the purpose of screening for the presence of diabetes:  <5.7%     Consistent with the absence of diabetes  5.7-6.4%  Consistent with increasing risk for diabetes "   (prediabetes)  >or=6.5%  Consistent with diabetes  Currently no consensus exists for use of hemoglobin A1C  for diagnosis of diabetes for children.      Comment for A1C at 10:17 AM on 11/15/2016:  According to ADA guidelines, hemoglobin A1C <7.0% represents  optimal control in non-pregnant diabetic patients.  Different  metrics may apply to specific populations.   Standards of Medical Care in Diabetes - 2016.  For the purpose of screening for the presence of diabetes:  <5.7%     Consistent with the absence of diabetes  5.7-6.4%  Consistent with increasing risk for diabetes   (prediabetes)  >or=6.5%  Consistent with diabetes  Currently no consensus exists for use of hemoglobin A1C  for diagnosis of diabetes for children.        Allergies as of 2/2/2017     No Known Allergies      Advance Directives     An advance directive is a document which, in the event you are no longer able to make decisions for yourself, tells your healthcare team what kind of treatment you do or do not want to receive, or who you would like to make those decisions for you.  If you do not currently have an advance directive, [facility] encourages you to create one.  For more information call:  [phone]), [phone] ([phone]),  or log on to [email or web address].        Language Assistance Services     ATTENTION: Language assistance services are available, free of charge. Please call [phone].      ATENCIÓN: Si habla español, tiene a almanza disposición servicios gratuitos de asistencia lingüística. Llame al [phone].     CHÚ Ý: N?u b?n nói Ti?ng Vi?t, có các d?ch v? h? tr? ngôn ng? mi?n phí dành cho b?n. G?i s? [phone].        Diabetes Discharge Instructions                                   [facility] Sign-Up     Activating your [facility] account is as easy as 1-2-3!     1) Visit [email or web address], select Sign Up Now, enter this activation code and your date of birth, then select  Next.  [email or web address]  Expires: 2/16/2017  3:29 PM      2) Create a username and password to use when you visit [facility] in the future and select a security question in case you lose your password and select Next.    3) Enter your e-mail address and click Sign Up!    Additional Information  If you have questions, please e-mail [email or web address] or call [phone] to talk to our [facility] staff. Remember, [facility] is NOT to be used for urgent needs. For medical emergencies, dial 911.          [facility] complies with applicable Federal civil rights laws and does not discriminate on the basis of race, color, national origin, age, disability, or sex.

## 2017-01-30 NOTE — H&P
"[facility]  History & Physical  Obstetrics & Gynecology    Date:  2017    Chief Complaint:  Repeat  section and bilateral tubal ligation    History of Present Illness:      Isabel Curry is a 31 y.o.  at 37w4d who presents to L&D for a scheduled repeat  delivery secondary to gestational HTN and declined trail of labor after  to avoid the risk of uterine rupture.  Pt also requesting bilateral tubal ligation and is resolute in her decision, "I don't want any more kids".  Pt has no major complaints this morning.  Pt denies any vaginal bleeding, leakage of fluid, contractions, and notes active fetal movement.  Pt denies headaches, vision changes, epigastric pain, or acute swelling.  Pt antepartum course is remarkable for DM-type 2 on glyburide, gestational HTN, hepatitis B chronic carrier, prior  x 2, and desires BTL.    Past Medical History:       Hepatitis B carrier   DM-type 2  Gestational HTN     Past Surgical History:        x 2  Suction D&C x 1      Medications:       Prenatal vitamins  Glyburide to 1.25 mg qhs and 2.5 mg at qnoon     Allergies:      NKDA      Obstetric History:       T2  TAB0 SAB1 E0 M0 L2    # Outcome Date GA Weight Sex Delivery Anes PTL Lv   6 Current                   5 Term 13 38w0d 3.929 kg (8 lb 10.6 oz) M CS-LTranv Spinal N Y   Name: ROSSI,BABY BOY   Apgar1: 9 Apgar5: 9   4 SAB  13w0d               3 AB                  2 AB                  1 Term    3.43 kg (7 lb 9 oz) F CS-LTranv     Y      Gynecologic History:       Denies recent/active STI  Denies history abnormal Pap      Social History:      Denies tobacco, alcohol or illicit drug use  Office work  Current partner is father of baby  Denies domestic abuse      Family History:       Problem Relation Age of Onset    Diabetes Mother      Diabetes Maternal Grandmother      Hypertension Maternal Grandmother     Denies " congenital anomalies, inherited syndromes, fetal aneuploidy     Physical Exam:     Temp:  [97.9 °F (36.6 °C)] 97.9 °F (36.6 °C)  Pulse:  [] 93  Resp:  [18] 18  SpO2:  [97 %-98 %] 97 %  BP: (127-128)/(85-87) 127/87     GENERAL:  No acute distress.  Alert and oriented to person, place and time.  HEENT:  Normocephalic, atraumatic, anicteric, EOMI, moist mucus membranes.  Neck supple without masses.  LUNGS:  Clear to auscultation bilaterally.  HEART:  Regular rate & rhythm with physiologic heart sounds.  ABDOMEN:  Soft, non tender without any guarding, rigidity or rebound. Normoactive bowel sounds.  PELVIC:  Normal female external genitalia without gross lesions, rashes or excoriations. No gross vaginal or cervical lesions.  Cervix closed/thick/high/posterior. No vaginal bleeding or leakage of fluid.  EXTREMITIES:  Symmetric without cramping, claudication. Trace edema LE. +2 distal pulses.  Full range of motion.  SKIN:  No rashes, good turgor & capillary refill.  NEUROLOGIC:  Grossly intact bilaterally. +2 DTR, symmetric.  PSYCH:  Mood & affect appropriate.       Laboratory Data:     Recent Labs  Lab 17  0934   WBC 5.79   RBC 3.88*   HGB 10.5*   HCT 33.0*      MCV 85   MCH 27.1   MCHC 31.8*   ABO:  O POS    Fetal Heart Rate Monitoring (NST):     Category: 1  Tocometry: irregular ctx    Assessment:     1. 31 y.o.  at 37w4d for repeat  section secondary to gestational hypertension and declined trail of labor after  (TOLAC) to avoid the risk of uterine rupture  2. Multiparity, desires permanent sterilization.  3. Diabetes type 2 on glyburide  4. Chronic hepatitis B    Plan:     Admit to L&D for repeat  and bilateral tubal ligation    The patient was informed of the risks, benefits, alternatives and possible complications to repeat  section and blood transfusion including pre-admission, admission, and post-admission procedures and expectations.  Risks of   section included, but were not limited to, death, urinary and/or fecal incontinence, injury to bladder and/or urinary tract, injury to bowel and/or intestinal obstruction, risk of infection, damage to major blood vessels, hemorrhage requiring transfusion of blood products and/or hysterectomy, painful intercourse, ovarian failure requiring hormone administration, pulmonary embolism, fistula formation, sexual dysfunction, failure of wound to heal, hernia, permanent and disfiguring scarring.  In the event of a hysterectomy +/- bilateral salpingo-oophorectomy (BS&O) if uterine/ovarian injury or uncontrollable hemorrhage were to occur, the patient was counseled extensively on the inability to become pregnant following a hysterectomy and in the event of a BS&O will result in surgical menopause.  In addition, the patient was informed of the risks, benefits, alternatives and possible complications to bilateral tubal ligation including but not limited to risk of failure with increased risk of ectopic gestation if pregnancy occurs.  The patient expressed understanding of the risks involved.  All questions were answered and the patient was consented for  section, bilateral tubal ligation, and blood transfusion.   The patient expressed understanding of the risks involved.  All questions were answered and informed consent was obtained for  delivery, blood transfusion, and all indicated procedures.      Routine admit labs    Continuous fetal monitoring    Consult anesthesia, spinal/epidural prn    Nursery/NICU aware    Hold glyburide for now, monitor glucose post-partum    Notify pedi of hep B status, risk of vertical transmission discussed      Chavez Gonzales MD

## 2017-01-30 NOTE — ANESTHESIA PROCEDURE NOTES
Spinal    Diagnosis: IUP  Patient location during procedure: OR  Start time: 1/30/2017 12:48 PM  Timeout: 1/30/2017 12:47 PM  End time: 1/30/2017 12:51 PM  Staffing  Anesthesiologist: GERBER HIGGINBOTHAM  Resident/CRNA: CORNELIA MARTÍNEZ  Performed by: anesthesiologist   Preanesthetic Checklist  Completed: patient identified, site marked, surgical consent, pre-op evaluation, timeout performed, IV checked, risks and benefits discussed and monitors and equipment checked  Spinal Block  Patient position: sitting  Prep: ChloraPrep  Patient monitoring: heart rate  Approach: midline  Location: L3-4  Injection technique: single shot  CSF Fluid: clear free-flowing CSF  Needle  Needle type: pencil-tip   Needle gauge: 25 G  Needle length: 3.5 in  Additional Documentation: incremental injection, no paresthesia on injection and negative aspiration for heme  Needle localization: anatomical landmarks  Assessment  Ease of block: easy  Patient's tolerance of the procedure: no complaints and comfortable throughout block  Medications:  Bolus administered: 1.6 mL of with dextrose and 0.75 bupivacaine  Epinephrine added: none  Opioid administered: 10 mcg of   fentanyl

## 2017-01-30 NOTE — TREATMENT PLAN
Pt arrived to unit. Oriented to room and poc. Towels and hibiclens at bedside. Pt instructed to shower and call when done. Pt verbalized understanding.

## 2017-01-30 NOTE — ANESTHESIA PREPROCEDURE EVALUATION
2017  Pre-operative evaluation for Procedure(s) (LRB):  DELIVERY- SECTION WITH TUBAL (N/A)    Isabel Curry is a 31 y.o. female DM-type 2 on glyburide, gestational HTN, Hep B chronic carrier, previous  x 2.  OB History      Para Term  AB TAB SAB Ectopic Multiple Living    6 2 2  3 0 1   2        There is no height or weight on file to calculate BMI.    Patient Active Problem List   Diagnosis    Pregnancy with one fetus    Diabetes mellitus affecting pregnancy in third trimester    H/O  section    Pregnancy-induced hypertension in third trimester       Review of patient's allergies indicates:  No Known Allergies    No current facility-administered medications on file prior to encounter.      Current Outpatient Prescriptions on File Prior to Encounter   Medication Sig Dispense Refill    blood sugar diagnostic Strp 1 strip by Misc.(Non-Drug; Combo Route) route 4 (four) times daily as needed (fasting and 2 hours after each meals). 100 strip 11    blood-glucose meter kit Use as instructed 1 each 0    docusate sodium (COLACE) 100 MG capsule Take 1 capsule (100 mg total) by mouth 2 (two) times daily as needed for Constipation. 60 capsule 1    EASY TOUCH LANCING DEVICE Misc test FOUR TIMES DAILY  0    ferrous gluconate (FERGON) 325 MG Tab Take 1 tablet (325 mg total) by mouth 2 (two) times daily with meals. 60 tablet 1    glyBURIDE (DIABETA) 2.5 MG tablet Take 0.5 tablets (1.25 mg total) by mouth 2 (two) times daily with meals. 90 tablet 1    lancets 30 gauge Misc 1 lancet by Misc.(Non-Drug; Combo Route) route 4 (four) times daily as needed. 100 each 11    prenatal multivit-Ca-min-Fe-FA (PRENATAL VITAMIN) Tab Take 1 tablet by mouth once daily. 30 each 11    promethazine (PHENERGAN) 12.5 MG Tab Take 1 tablet (12.5 mg total) by mouth every 6 (six) hours as  needed (nausea). 30 tablet 1    ranitidine (ZANTAC) 150 MG tablet Take 1 tablet (150 mg total) by mouth nightly as needed for Heartburn. 30 tablet 1       Past Surgical History   Procedure Laterality Date     section           Dilation and curettage of uterus       2012       Social History     Social History    Marital status: Single     Spouse name: N/A    Number of children: N/A    Years of education: N/A     Occupational History    Not on file.     Social History Main Topics    Smoking status: Never Smoker    Smokeless tobacco: Not on file    Alcohol use No    Drug use: No    Sexual activity: Yes     Partners: Male     Birth control/ protection: None, IUD     Other Topics Concern    Not on file     Social History Narrative         Vital Signs Range (Last 24H):         CBC:   Recent Labs      17   0934   WBC  5.79   RBC  3.88*   HGB  10.5*   HCT  33.0*   PLT  187   MCV  85   MCH  27.1   MCHC  31.8*     [facility] Anesthesia Evaluation    I have reviewed the Patient Summary Reports.     I have reviewed the Medications.     Review of Systems  Anesthesia Hx:  No problems with previous Anesthesia  History of prior surgery of interest to airway management or planning: Previous anesthesia: Epidural, General Denies Family Hx of Anesthesia complications.   Denies Personal Hx of Anesthesia complications.   Social:  Non-Smoker, No Alcohol Use    Hematology/Oncology:     Oncology Normal   Hematology Comments: Anemia   EENT/Dental:EENT/Dental Normal   Cardiovascular:   Hypertension    Pulmonary:  Pulmonary Normal    Renal/:  Renal/ Normal     Hepatic/GI:   GERD Liver Disease, Hepatitis, B    Musculoskeletal:  Musculoskeletal Normal    Neurological:  Neurology Normal    Endocrine:   Diabetes, type 2    Dermatological:  Skin Normal    Psych:  Psychiatric Normal           Physical Exam  General:  Well nourished Gravid    Airway/Jaw/Neck:  Airway Findings: Mouth Opening: Normal Tongue: Normal   General Airway Assessment: Adult, Average  Mallampati: II  Improves to II with phonation.  TM Distance: Normal, at least 6 cm        Eyes/Ears/Nose:  EYES/EARS/NOSE FINDINGS: Normal   Dental:  DENTAL FINDINGS: Normal   Chest/Lungs:  Chest/Lungs Findings: Clear to auscultation, Normal Respiratory Rate     Heart/Vascular:  Heart Findings: Rate: Normal  Rhythm: Regular Rhythm  Heart murmur: negative Vascular Findings: Normal    Abdomen:  Abdomen Findings:  Gravid     Musculoskeletal:  Musculoskeletal Findings: Normal   Skin:  Skin Findings: Normal    Mental Status:  Mental Status Findings: Normal        Anesthesia Plan  Type of Anesthesia, risks & benefits discussed:  Anesthesia Type:  spinal  Patient's Preference:   Intra-op Monitoring Plan: standard ASA monitors  Intra-op Monitoring Plan Comments:   Post Op Pain Control Plan:   Post Op Pain Control Plan Comments:   Induction:    Beta Blocker:  Patient is not currently on a Beta-Blocker (No further documentation required).       Informed Consent: Patient understands risks and agrees with Anesthesia plan.  Questions answered. Anesthesia consent signed with patient.  ASA Score: 3     Day of Surgery Review of History & Physical:    H&P update referred to the surgeon.         Ready For Surgery From Anesthesia Perspective.

## 2017-01-31 LAB
BASOPHILS # BLD AUTO: 0.01 K/UL
BASOPHILS NFR BLD: 0.2 %
DIFFERENTIAL METHOD: ABNORMAL
EOSINOPHIL # BLD AUTO: 0 K/UL
EOSINOPHIL NFR BLD: 0.5 %
ERYTHROCYTE [DISTWIDTH] IN BLOOD BY AUTOMATED COUNT: 16.7 %
HCT VFR BLD AUTO: 26.1 %
HGB BLD-MCNC: 8.3 G/DL
LYMPHOCYTES # BLD AUTO: 1.3 K/UL
LYMPHOCYTES NFR BLD: 20 %
MCH RBC QN AUTO: 26.9 PG
MCHC RBC AUTO-ENTMCNC: 31.8 %
MCV RBC AUTO: 85 FL
MONOCYTES # BLD AUTO: 0.6 K/UL
MONOCYTES NFR BLD: 9.2 %
NEUTROPHILS # BLD AUTO: 4.5 K/UL
NEUTROPHILS NFR BLD: 70.1 %
PLATELET # BLD AUTO: 156 K/UL
PMV BLD AUTO: 10.7 FL
RBC # BLD AUTO: 3.09 M/UL
WBC # BLD AUTO: 6.4 K/UL

## 2017-01-31 PROCEDURE — 85025 COMPLETE CBC W/AUTO DIFF WBC: CPT

## 2017-01-31 PROCEDURE — 63600175 PHARM REV CODE 636 W HCPCS: Performed by: OBSTETRICS & GYNECOLOGY

## 2017-01-31 PROCEDURE — 36415 COLL VENOUS BLD VENIPUNCTURE: CPT

## 2017-01-31 PROCEDURE — 11000001 HC ACUTE MED/SURG PRIVATE ROOM

## 2017-01-31 PROCEDURE — 25000003 PHARM REV CODE 250: Performed by: OBSTETRICS & GYNECOLOGY

## 2017-01-31 RX ADMIN — KETOROLAC TROMETHAMINE 30 MG: 30 INJECTION, SOLUTION INTRAMUSCULAR at 06:01

## 2017-01-31 RX ADMIN — OXYCODONE HYDROCHLORIDE AND ACETAMINOPHEN 1 TABLET: 10; 325 TABLET ORAL at 10:01

## 2017-01-31 RX ADMIN — OXYCODONE HYDROCHLORIDE AND ACETAMINOPHEN 1 TABLET: 10; 325 TABLET ORAL at 05:01

## 2017-01-31 RX ADMIN — DOCUSATE SODIUM 200 MG: 100 CAPSULE, LIQUID FILLED ORAL at 09:01

## 2017-01-31 RX ADMIN — KETOROLAC TROMETHAMINE 30 MG: 30 INJECTION, SOLUTION INTRAMUSCULAR at 11:01

## 2017-01-31 RX ADMIN — OXYCODONE HYDROCHLORIDE AND ACETAMINOPHEN 1 TABLET: 10; 325 TABLET ORAL at 03:01

## 2017-01-31 RX ADMIN — SIMETHICONE CHEW TAB 80 MG 80 MG: 80 TABLET ORAL at 10:01

## 2017-01-31 RX ADMIN — OXYCODONE HYDROCHLORIDE AND ACETAMINOPHEN 1 TABLET: 10; 325 TABLET ORAL at 09:01

## 2017-01-31 RX ADMIN — IBUPROFEN 800 MG: 400 TABLET, FILM COATED ORAL at 09:01

## 2017-01-31 NOTE — PHYSICIAN QUERY
"PT Name: Isabel Curry  MR #: [id number]     Physician Query Form - Documentation Clarification    Reviewer Abigail Christie RN   Ext [phone]    This form is a permanent document in the medical record.     Query Date: 2017  By submitting this query, we are merely seeking further clarification of documentation to reflect the severity of illness of your patient. Please utilize your independent clinical judgment when addressing the question(s) below.    (The Medical record reflects the following:)      Supporting Clinical Findings Location in Medical Record   "Multiparity, desires permanent sterilization"    "Chief Complaint: Repeat  section and bilateral tubal ligation"   H&P 17    H&P 17   "Procedure performed:  Primary low transverse  section via pfannenstiel skin incision Bilateral tubal ligation via Filshie clips "    "The uterus was returned to the abdomen. The gutters were cleaned of all clots and debris.  Hemostasis was noted. The rectus muscles were reapproximated with 2-0 chromic. The fascia was reapproximated with 0 vicryl in a running fashion. The subcutaneous fat was reapproximated with 2-0 plain gut.  The skin was closed with absorbable Insorb staples. The patient tolerated the procedure well. "   L&D Delivery Note 17        L&D Delivery Note 17                                                                            Doctor, Please specify diagnosis or diagnoses associated with above clinical findings.  Please specify what procedures were performed for this patient and addend your operative report if necessary.    Physician Use Only    ___Repeat low transverse  section only      _X__Repeat low transverse  section with bilateral tubal ligation via Filshie clips      ___Other procedure, please specify:______________________________                                                                                                           "   [  ] Unable to determine

## 2017-01-31 NOTE — PLAN OF CARE
Problem: Patient Care Overview  Goal: Plan of Care Review  Outcome: Ongoing (interventions implemented as appropriate)  VSS. Aragon D/Shashank. Waiting on void. Regular diet, tolerating well. Active bowel sounds passing flatus. Good pain management with motrin and percocet. Bonding with infant. Verbalizes understanding of plan of care with good recall.

## 2017-01-31 NOTE — ANESTHESIA POSTPROCEDURE EVALUATION
"Anesthesia Post Evaluation    Patient: Isabel Curry    Procedure(s) Performed: Procedure(s) (LRB):  DELIVERY- SECTION WITH TUBAL (N/A)    Final Anesthesia Type: spinal  Patient location during evaluation: labor & delivery  Patient participation: Yes- Able to Participate  Level of consciousness: awake and alert and oriented  Post-procedure vital signs: reviewed and stable  Pain management: adequate  Airway patency: patent  PONV status at discharge: No PONV  Anesthetic complications: no      Cardiovascular status: blood pressure returned to baseline and hemodynamically stable  Respiratory status: unassisted, spontaneous ventilation and room air  Hydration status: euvolemic  Follow-up not needed.        Visit Vitals    BP (!) 118/55    Pulse 78    Temp 36.3 °C (97.4 °F) (Oral)    Resp 18    Ht 5' 6" (1.676 m)    Wt 93.9 kg (207 lb)    LMP 2016 (Exact Date)    SpO2 100%    Breastfeeding No    BMI 33.41 kg/m2       Pain/Starla Score: Pain Assessment Performed: Yes (2017  6:03 AM)  Presence of Pain: denies (2017  6:03 AM)  Pain Rating Prior to Med Admin: 2 (2017  5:32 AM)  Pain Rating Post Med Admin: 0 (2017  6:03 AM)      "

## 2017-01-31 NOTE — PLAN OF CARE
Problem: Postpartum ( Delivery) (Adult,Obstetrics,Pediatric)  Goal: Signs and Symptoms of Listed Potential Problems Will be Absent, Minimized or Managed (Postpartum)  Signs and symptoms of listed potential problems will be absent, minimized or managed by discharge/transition of care (reference Postpartum ( Delivery) (Adult,Obstetrics,Pediatric) [facility]).   Outcome: Ongoing (interventions implemented as appropriate)  Pt care report received from JAKE Galvan.  Meet and greet with pt done at 0712. incisional pain score 3/10 with goal 5/10 at present.  AM assessment done.  Low transverse abd incision dry and intact with steri strip in place.  Pain options discussed.  Assisted up to void at 0730- voided 900 cc urine without difficulty.  Warm tap water irrigation post void instructions reviewed.   Ambulated in room x 5 minutes.  Ambulation encouraged for gas elimination   Plan of care discussed.

## 2017-02-01 PROCEDURE — 11000001 HC ACUTE MED/SURG PRIVATE ROOM

## 2017-02-01 PROCEDURE — 25000003 PHARM REV CODE 250: Performed by: OBSTETRICS & GYNECOLOGY

## 2017-02-01 RX ORDER — OXYCODONE AND ACETAMINOPHEN 5; 325 MG/1; MG/1
1 TABLET ORAL EVERY 4 HOURS PRN
Qty: 30 TABLET | Refills: 0 | Status: SHIPPED | OUTPATIENT
Start: 2017-02-01 | End: 2017-03-16 | Stop reason: ALTCHOICE

## 2017-02-01 RX ORDER — IBUPROFEN 600 MG/1
600 TABLET ORAL EVERY 6 HOURS PRN
Qty: 60 TABLET | Refills: 0 | Status: SHIPPED | OUTPATIENT
Start: 2017-02-01 | End: 2017-03-16 | Stop reason: ALTCHOICE

## 2017-02-01 RX ADMIN — OXYCODONE HYDROCHLORIDE AND ACETAMINOPHEN 1 TABLET: 10; 325 TABLET ORAL at 05:02

## 2017-02-01 RX ADMIN — SIMETHICONE CHEW TAB 80 MG 80 MG: 80 TABLET ORAL at 10:02

## 2017-02-01 RX ADMIN — MAGNESIUM HYDROXIDE 2400 MG: 400 SUSPENSION ORAL at 10:02

## 2017-02-01 RX ADMIN — DOCUSATE SODIUM 200 MG: 100 CAPSULE, LIQUID FILLED ORAL at 09:02

## 2017-02-01 RX ADMIN — DOCUSATE SODIUM 200 MG: 100 CAPSULE, LIQUID FILLED ORAL at 10:02

## 2017-02-01 RX ADMIN — IBUPROFEN 800 MG: 400 TABLET, FILM COATED ORAL at 10:02

## 2017-02-01 RX ADMIN — IBUPROFEN 800 MG: 400 TABLET, FILM COATED ORAL at 01:02

## 2017-02-01 RX ADMIN — IBUPROFEN 800 MG: 400 TABLET, FILM COATED ORAL at 05:02

## 2017-02-01 NOTE — PLAN OF CARE
Problem: Patient Care Overview  Goal: Plan of Care Review  Outcome: Ongoing (interventions implemented as appropriate)  VSS. Ambulating and voiding. Regular diet, tolerating well. Active bowel sounds passing flatus. Good pain management with motrin and percocet. Bonding with infant. Verbalizes understanding of plan of care with good recall.

## 2017-02-01 NOTE — PROGRESS NOTES
[facility]    Progress Note  Obstetrics & Gynecology    Date: 2017    Post-op Day # 1 - s/p repeat  at 37w4d secondary to gestational hypertension, DM type 2 on medication, and declined trail of labor after .    Subjective:    Pt c/o mild crampy lower abd/incisional pain  No acute events overnight  No headaches, vision changes, epigastric pain, SOB, CP  Pain well controlled  Lochia less than menses  Bottle/breast feeding well  Breast non-tender, non-engorged  Ambulating, tolerating diet, and voiding without diffculty   Bonding well with baby    Objective:    Temp:  [96.8 °F (36 °C)-98.6 °F (37 °C)] 97.7 °F (36.5 °C)  Pulse:  [66-89] 75  Resp:  [18-20] 18  BP: (118-131)/(55-90) 129/90    Intake/Output Summary (Last 24 hours) at 17 1905  Last data filed at 17 1634   Gross per 24 hour   Intake             1200 ml   Output             4000 ml   Net            -2800 ml     Clear, dara urine    GENERAL:  No acute distress. Alert and oriented x 3.   HEENT:  No scleral icterus. Neck supple. Moist mucus membranes.   LUNGS:  Clear to auscultation bilaterally.   HEART:  Regular rate and rhythm with physiologic heart sounds.   ABDOMEN:  Soft, appropriately tender, non-distended, no guarding, rigidity, or rebound with normoactive bowel sounds.  FUNDUS:  Firm, nontender below the umbilicus.   INCISION: Clean, dry, & intact.  EXTREMITIES:  No cramping, claudication. Trace edema LE. +2 distal pulses. Symmetric, full range of motion, negative Lee.  NEUROLOGIC:  Grossly intact bilaterally.  +2 DTR's.   PSYCH:  Mood and affect appropriate.   PERINEUM:  Intact with light lochia.     CBC:    Recent Results (from the past 336 hour(s))   CBC auto differential    Collection Time: 17  6:27 AM   Result Value Ref Range    WBC 6.40 3.90 - 12.70 K/uL    Hemoglobin 8.3 (L) 12.0 - 16.0 g/dL    Hematocrit 26.1 (L) 37.0 - 48.5 %    Platelets 156 150 - 350 K/uL   CBC with Auto  Differential    Collection Time: 17  9:34 AM   Result Value Ref Range    WBC 5.79 3.90 - 12.70 K/uL    Hemoglobin 10.5 (L) 12.0 - 16.0 g/dL    Hematocrit 33.0 (L) 37.0 - 48.5 %    Platelets 187 150 - 350 K/uL   CBC auto differential    Collection Time: 17 10:50 AM   Result Value Ref Range    WBC 7.16 3.90 - 12.70 K/uL    Hemoglobin 10.3 (L) 12.0 - 16.0 g/dL    Hematocrit 32.0 (L) 37.0 - 48.5 %    Platelets 181 150 - 350 K/uL     ABO:  O POS    Assessment:    1. Post-op day # 1 - IUP at 37w4d s/p repeat low transverse  and bilateral tubal ligation - progressing well.  2. Gestational hypertension  3. Diabetes mellitus, type 2 on glyburide during pregnancy, diet controlled prior to pregnancy  4. Anemia, asymptomatic  5. Hepatitis B chronic carrier    Plan:    Continue post-op care  Review post-partum care instructions and precautions  Administer medications as ordered  Advance diet as tolerated  Encourage ambulation, SCD's  Encourage breast-feeding  Monitor fasting glucose  Fe supplementation, anemia precautions  Pedi aware of maternal hep B status, f/u with PCP for long term mgt  Surgical/delivery findings, labs/studies, and expectations were discussed with pt.  All questions answered and pt voiced understanding.       Chavez Gonzales MD

## 2017-02-02 VITALS
TEMPERATURE: 97 F | WEIGHT: 207 LBS | RESPIRATION RATE: 20 BRPM | DIASTOLIC BLOOD PRESSURE: 86 MMHG | HEIGHT: 66 IN | HEART RATE: 91 BPM | OXYGEN SATURATION: 100 % | BODY MASS INDEX: 33.27 KG/M2 | SYSTOLIC BLOOD PRESSURE: 137 MMHG

## 2017-02-02 PROCEDURE — 25000003 PHARM REV CODE 250: Performed by: OBSTETRICS & GYNECOLOGY

## 2017-02-02 RX ADMIN — DOCUSATE SODIUM 200 MG: 100 CAPSULE, LIQUID FILLED ORAL at 08:02

## 2017-02-02 RX ADMIN — IBUPROFEN 800 MG: 400 TABLET, FILM COATED ORAL at 05:02

## 2017-02-02 RX ADMIN — IBUPROFEN 800 MG: 400 TABLET, FILM COATED ORAL at 01:02

## 2017-02-02 RX ADMIN — OXYCODONE HYDROCHLORIDE AND ACETAMINOPHEN 1 TABLET: 10; 325 TABLET ORAL at 07:02

## 2017-02-02 NOTE — DISCHARGE INSTRUCTIONS
After a Cesearean Birth    General Discharge Instructions  · May follow a regular diet, unless otherwise discussed with physician.  · Take showers, not baths unless otherwise discussed with physician.  · Activity as tolerated.  · No lifting or heavy exercise for 6 weeks, no driving for 2 weeks, no sexual intercourse, douching or tampons for 6 weeks  · May return to work/school as discussed with physician  · Discuss birth control with physician  · Breast care support bra worn at all times  · Lactation consultant referral number ( [phone] or [phone])    Call Your Healthcare Provider Right Away If You Have:  · A fever of 101°F or higher.  · Incisional drainage  · Heavy vaginal bleeding, clots, or vaginal discharge with foul odor.  · Redness, discharge, or pain worse than you had in the hospital.  · Burning, pain, red streaks, or lumpy areas in your breasts.  · Burning or pain when you urinate.  · Persistent nausea or vomiting.  · Severe headaches, blurred vision, dizziness or fainting.  · Feelings of extreme sadness or anxiety, or a feeling that you dont want to be with your baby.  · No bowel movement for 5 days.  · Redness, warmth, swelling, or pain in the lower leg.    Incision Care  · You will be able to shower and pat the incision dry.  · Watch your incision for signs of infection, such as increasing redness or drainage.  · For ease of movement, hold a pillow against the incision when you get up from a lying or sitting position, and when you laugh or cough.  · Avoid heavy lifting--nothing heavier than your baby until your doctor instructs you otherwise.   Follow-Up  Schedule a  follow-up exam with your healthcare provider for about 6 weeks after delivery. During this exam, your uterus and vaginal area will be checked. Contact your healthcare provider if you think you or your baby are having any problems.

## 2017-02-02 NOTE — PLAN OF CARE
Problem: Patient Care Overview  Goal: Plan of Care Review  Outcome: Ongoing (interventions implemented as appropriate)  Plan of care discussed with pt.  Pt verbalized understanding.

## 2017-02-02 NOTE — PLAN OF CARE
Problem: Patient Care Overview  Goal: Plan of Care Review  Outcome: Outcome(s) achieved Date Met:  02/02/17  Patient in stable condition. Verbalizes understanding of care plan with good recall.

## 2017-02-02 NOTE — NURSING
Pt c/o pain to bilateral breast, states she feels engorged. Breast wrapped with ace and ice applied

## 2017-02-02 NOTE — PROGRESS NOTES
[facility] [place]    Progress Note  Obstetrics & Gynecology    Date: 2017    Post-op Day # 2 - s/p repeat  at 37w4d and bilateral tubal ligation secondary to gestational hypertension, DM type 2 on medication, declined trail of labor after , and desire permanent sterilization.    Subjective:    Pt w/o major complaints  No acute events overnight  No headaches, vision changes, epigastric pain, SOB, CP  Pt requesting to go home  Pain well controlled  Lochia less than menses  Bottle/breast feeding well  Breast non-tender, non-engorged  Ambulating, tolerating diet, and voiding without diffculty   Bonding well with baby    Objective:    Temp:  [97.3 °F (36.3 °C)-98.7 °F (37.1 °C)] 97.8 °F (36.6 °C)  Pulse:  [79-92] 87  Resp:  [18-20] 18  BP: (131-144)/(75-88) 141/75    Clear, dara urine    GENERAL:  No acute distress. Alert and oriented x 3.   HEENT:  No scleral icterus. Neck supple. Moist mucus membranes.   LUNGS:  Clear to auscultation bilaterally.   HEART:  Regular rate and rhythm with physiologic heart sounds.   ABDOMEN:  Soft, appropriately tender, non-distended, no guarding, rigidity, or rebound with normoactive bowel sounds.  FUNDUS:  Firm, nontender below the umbilicus.   INCISION: Clean, dry, & intact.  EXT:  No cramping, claudication. Trace edema LE. +2 distal pulses. Symmetric, FROM, negative Lee.  NEURO:  Grossly intact bilaterally.  +2 DTR's.   PSYCH:  Mood and affect appropriate.   PERINEUM:  Intact with light lochia.     Assessment:    1. Post-op day # 2 - IUP at 37w4d s/p repeat low transverse  and bilateral tubal ligation - progressing well  2. Gestational hypertension  3. Diabetes mellitus, type 2 on glyburide during pregnancy, diet controlled prior to pregnancy  4. Anemia, asymptomatic  5. Hepatitis B chronic carrier    Plan:    Continue post-op care.    Review post-partum care instructions and precautions.    Administer medication as  ordered.    Monitor blood pressure at home TID and call office if SBP > 160 and/or DBP > 100.  Hypertensive, pre-eclampsia precautions reviewed.    Continue accuchek at home, discontinue glyburide for now, encourage healthy lifestyle modifications.    Follow up PCP for long term f/u of DM and hep B.    Plan for discharge in AM.    Reviewed discharge medications.  Pt was instructed to avoid driving or operate heavy machinery while taking narcotics or other medications with sedative properties.    Post-partum care instructions and precautions, clinical/delivery findings, and hospital course reviewed with pt prior to discharge.  All of her questions were answered to her satisfaction.  Pt voiced understanding and agreeable with discharge.    Return to clinic in 2 weeks for post-partum visit or sooner if any concerns.  Go to ER for any emergencies.      Chavez Gonzales MD

## 2017-02-14 ENCOUNTER — TELEPHONE (OUTPATIENT)
Dept: OBSTETRICS AND GYNECOLOGY | Facility: CLINIC | Age: 32
End: 2017-02-14

## 2017-02-14 NOTE — TELEPHONE ENCOUNTER
----- Message from Dajasue Haynes sent at 2/14/2017  3:00 PM CST -----  Contact: self  Pt would like to schedule her post-opt. Pt can be reach at [phone]. Thanks!

## 2017-02-20 NOTE — DISCHARGE SUMMARY
[facility]    Discharge Summary  Obstetrics & Gynecology    Date of Admission:  2017    Date of Discharge:  2017    Date of Delivery:  2017    Admitting Diagnosis:       To IUP at 37w4d for repeat  delivery  Gestational hypertension  Diabetes mellitus, type 2 on glyburide during pregnancy, diet controlled prior to pregnancy  Anemia, asymptomatic  Hepatitis B chronic carrier     Discharge Diagnosis:    To IUP at term s/p repeat low transverse   Gestational hypertension  Diabetes mellitus, type 2 on glyburide during pregnancy, diet controlled prior to pregnancy  Anemia, asymptomatic  Hepatitis B chronic carrier    Procedure performed:      Repeat low transverse  delivery and bilateral tubal ligation via a pfannenstiel skin incision    Brief Hospital Course:      See H&P for complete details. In brief, Isabel Curry is a 31 y.o.  at 37w4d gestation who is here for a scheduled repeat  delivery secondary to gestational hypertension and declined trail of labor after  to the avoid risk of uterine rupture. Pt also requesting bilateral tubal ligation. Maternal and fetal status was overall reassuring. Pt was informed of the risks, benefits, alternatives and possible complications to  and blood transfusion including pre-admission, admission, and post-admission procedures and expectations.  Pt underwent a  delivery of a viable infant.  Please see  operative report for further details of the delivery.  Following surgery, pt was transfer to L&D recovery for post-partum care.  Pt had a fairly uncomplicated postpartum course.   Prior to discharge, pt was without major complaints.  Pt pain was well controlled.  Pt was ambulating, tolerating a regular diet, voiding without difficulty, and bonding well with baby.  Pt lochia was light.  Pt vital signs where physiologic and stable.  Pt physical exam showed  "no acute findings.  Pt had satisfied routine postpartum discharge criteria and expressed the desire to be discharge from the hospital.     Pertinent Labs or Studies:      Lab Results   Component Value Date    WBC 6.40 01/31/2017    HGB 8.3 (L) 01/31/2017    HCT 26.1 (L) 01/31/2017    MCV 85 01/31/2017     01/31/2017     ABO:  O POS    Discharge Exam:      Visit Vitals    /86    Pulse 91    Temp 97.3 °F (36.3 °C)    Resp 20    Ht 5' 6" (1.676 m)    Wt 93.9 kg (207 lb)    LMP 05/20/2016 (Exact Date)    SpO2 100%    Breastfeeding Unknown    BMI 33.41 kg/m2     GENERAL:  No acute distress. Alert and oriented x 3.   HEENT:  Normocephalic. EOMI, PERRLA. No scleral icterus. Neck supple. Moist mucus membranes.   LUNGS:  Clear to auscultation bilaterally.   HEART:  Regular rate and rhythm with physiologic heart sounds.   ABDOMEN:  Soft, appropriately tender, non-distended, no guarding, rigidity, or rebound.   FUNDUS:  Firm, nontender below the umbilicus.  INCISION:  Clean, dry, & intact without signs of infection.   EXTREMITIES:  No cramping, claudication.  Trace edema. Good skin turgor. +2 distal pulses, symmetric. Full range of motion.   NEUROLOGIC:  Grossly intact bilaterally.   PSYCH:  Mood and affect appropriate.   PERINEUM:  Intact with light lochia.    Discharge Condition:  Stable      Discharge Disposition:  Home       Discharge Medications:     Resume prenatal vitamins 1 tab po qday  Fergon 325 mg 1 tab po bid, disp #60, refill 1  Colace 100 mg 1 tab po bid prn constipation, disp #60, refill 1  Motrin 600 mg 1 tab po q6h prn pain, disp #60, refill 0  Percocet 5/325 mg 1 tab po q4-6h prn breakthrough pain, disp #30, refill 0    Discharge Activites:      Resume activities as tolerated with adequate rest  Pelvic rest for 6 weeks   No heavy lifting greater 10 lbs or strenuous activities   Showers only  Wound care instructions and precautions given   Avoid driving and operating machinery while " taking narcotics or other sedative medications.  Patient voiced understanding.     Discharge Diet:  Regular diet    Discharge Instructions:      Pt was instructed to contact the clinic or emergency department for any concerns including but not limited to an increase in her lochia, abdominal pain, foul vaginal discharge, weakness, decrease activity level, decrease appetite, feeling sad or hopeless, inability to care for her baby, breast pain or infection, difficulty with voiding or having bowel movements, perineal pain or breakdown, wound breakdown, fever >100.4 or abnormal vital signs, shortness of breath, chest pain, dizziness or feeling faint, pain or swelling in her extremities, headaches not relieved with rest and Tylenol, vision changes, seizure activities, abnormal bleeding/bruising, or any other health concerns.  Post-partum care instructions and precautions, labs/studies, clinical/delivery findings, and hospital course reviewed with the patient prior to discharge.  All of her questions were answered to her satisfaction.  Pt voiced understanding.  Keep glucose log at home.  Glucose parameters to call office reviewed.  Keep blood pressure log at home and call office if bp sbp >155 or dbp >95.  Hypertensive, pre-eclampsia precautions reviewed.       Follow-up Visit:  2 weeks for postpartum visit, or sooner for any concerns.       Chavez Gonzales MD

## 2017-02-23 ENCOUNTER — OFFICE VISIT (OUTPATIENT)
Dept: OBSTETRICS AND GYNECOLOGY | Facility: CLINIC | Age: 32
End: 2017-02-23
Payer: MEDICAID

## 2017-02-23 VITALS
SYSTOLIC BLOOD PRESSURE: 115 MMHG | DIASTOLIC BLOOD PRESSURE: 84 MMHG | BODY MASS INDEX: 28.68 KG/M2 | WEIGHT: 178.44 LBS | HEIGHT: 66 IN

## 2017-02-23 PROCEDURE — 99212 OFFICE O/P EST SF 10 MIN: CPT | Mod: PBBFAC | Performed by: OBSTETRICS & GYNECOLOGY

## 2017-02-23 PROCEDURE — 99999 PR PBB SHADOW E&M-EST. PATIENT-LVL II: CPT | Mod: PBBFAC,,, | Performed by: OBSTETRICS & GYNECOLOGY

## 2017-02-23 RX ORDER — BLOOD-GLUCOSE METER
EACH MISCELLANEOUS
Refills: 0 | COMMUNITY
Start: 2016-12-13 | End: 2017-03-16 | Stop reason: ALTCHOICE

## 2017-02-23 NOTE — MR AVS SNAPSHOT
[facility] [place] - OB/ GYN  [address]  [address]  [place] [address]  Phone: [phone]                  Isabel Curry   2017 3:20 PM   Office Visit    Description:  Female : 1985   Provider:  Chavez Gonzales MD   Department:  [facility] [place] - OB/ GYN           Reason for Visit     Postpartum Care                To Do List           Goals (5 Years of Data)     None      [facility]     [facility] On Call Nurse Care Line -  Assistance  Registered nurses in the [facility] provide clinical advisement, health education, appointment booking, and other advisory services.  Call for this free service at [phone].             Medications           Message regarding Medications     Verify the changes and/or additions to your medication regime listed below are the same as discussed with your clinician today.  If any of these changes or additions are incorrect, please notify your healthcare provider.             Verify that the below list of medications is an accurate representation of the medications you are currently taking.  If none reported, the list may be blank. If incorrect, please contact your healthcare provider. Carry this list with you in case of emergency.           Current Medications     blood sugar diagnostic Strp 1 strip by Misc.(Non-Drug; Combo Route) route 4 (four) times daily as needed (fasting and 2 hours after each meals).    blood-glucose meter kit Use as instructed    docusate sodium (COLACE) 100 MG capsule Take 1 capsule (100 mg total) by mouth 2 (two) times daily as needed for Constipation.    EASY TOUCH LANCING DEVICE Misc test FOUR TIMES DAILY    ferrous gluconate (FERGON) 325 MG Tab Take 1 tablet (325 mg total) by mouth 2 (two) times daily with meals.    ibuprofen (ADVIL,MOTRIN) 600 MG tablet Take 1 tablet (600 mg total) by mouth every 6 (six) hours as needed for Pain.    lancets 30 gauge Misc 1 lancet by Misc.(Non-Drug; Combo Route) route 4 (four) times  daily as needed.    oxycodone-acetaminophen (PERCOCET) 5-325 mg per tablet Take 1 tablet by mouth every 4 (four) hours as needed for Pain.    prenatal multivit-Ca-min-Fe-FA (PRENATAL VITAMIN) Tab Take 1 tablet by mouth once daily.    ranitidine (ZANTAC) 150 MG tablet Take 1 tablet (150 mg total) by mouth nightly as needed for Heartburn.    TRUE METRIX GLUCOSE METER Misc USE AS DIRECTED           Clinical Reference Information           Prenatal Vitals     Enc. Date GA Prenatal Vitals Prenatal Pulse Pain Level Urine Albumin/Glucose Edema Presentation Dilation/Effacement/Station    2/23/17 37w4d 115/84 / 81 kg (178 lb 7.4 oz)           1/30/17 37w4d Admission Dx: Pregnancy Dept: [facility]    1/26/17 37w0d 142/76 (A) / 98.9 kg (218 lb 2.3 oz) 38 cm / 150 NST / Present 66 0 Negative / Negative None / None  0 / 0    1/23/17 36w4d 140/82 (A) / 94.3 kg (208 lb 0.1 oz) 37 cm / 150 NST / Present 70 0 Negative / Negative None / None  0 / 0    1/19/17 36w0d 132/72 / 93.9 kg (207 lb) 36 cm / 150 NST / Present 72 0 Negative / Trace None / None  0 / 0    1/16/17 35w4d 122/74 / 93 kg (205 lb) 36 cm / 145 NST / Present 60 0 Negative / Negative None / None      1/12/17 35w0d Admission Dept: [facility] L&D    1/12/17 35w0d 110/70 / 93.8 kg (206 lb 10.9 oz) 35 cm / 146 / Present 62 0 Negative / Negative None / None      1/9/17 34w4d 132/82 / 94.1 kg (207 lb 7.3 oz) 34 cm / 140 NST / Present 68 0 Trace / Negative None / None      1/5/17 34w0d 132/86 / 91.9 kg (202 lb 9.6 oz) 34 cm / 135 NST / Present 70 0 Negative / Negative None / None      1/2/17 33w4d Admission Dx: Normal pregnancy Dept: [facility] L&D    12/29/16 33w0d 127/81 / 92.6 kg (204 lb 2.3 oz) 34 cm / 130 NST / Present 64 0 Negative / Negative None / None      12/26/16 32w4d Admission Dx: Pregnancy Dept: [facility] L&D    12/22/16 32w0d 124/77 / 90.7 kg (199 lb 15.3 oz) 33 cm / 135 NST / Present 72 0 Negative / Negative None / None      12/19/16 31w4d 120/70 / 92.2 kg (203 lb 4.2 oz) 32  "cm / 150 NST / Present 60 0 Negative / Negative None / None      12/13/16 30w5d 123/76 / 90.9 kg (200 lb 6.4 oz) 31 cm / 142 / Present 68 0 Negative / Negative None / None      11/15/16 26w5d 120/70 / 89.9 kg (198 lb 3.1 oz) 26 cm / 146 / Present 70 0 Negative / Negative None / None      10/18/16 22w5d 110/74 / 88.2 kg (194 lb 8 oz)  / 150 / Present 64 0 Negative / Negative None / None      10/4/16 20w5d 121/76 / 87.5 kg (193 lb)  / 146 / Present 60 0 Negative / 1+ None / None      9/20/16 18w5d 120/80 / 86.6 kg (191 lb)  / 154 / Present 72 0 Negative / 1+ None / None      9/13/16 17w5d 119/79 / 86.6 kg (191 lb)  / 160 64 0 Negative / Negative None / None      8/16/16 13w5d 121/79 / 85.7 kg (189 lb)  / 158 70 0 Negative / Negative None / None  0 / 0       Number of babies: 1   Height: 5' 6" (1.676 m)       Your Vitals Were     BP Height Weight Last Period BMI    115/84 5' 6" (1.676 m) 81 kg (178 lb 7.4 oz) 05/20/2016 (Exact Date) 28.8 kg/m2      Blood Pressure          Most Recent Value    BP  115/84      Allergies as of 2/23/2017     No Known Allergies      Immunizations Administered on Date of Encounter - 2/23/2017     None      [facility] Sign-Up     Activating your [facility] account is as easy as 1-2-3!     1) Visit [email or web address], select Sign Up Now, enter this activation code and your date of birth, then select Next.  [email or web address]  Expires: 4/9/2017  3:43 PM      2) Create a username and password to use when you visit [facility] in the future and select a security question in case you lose your password and select Next.    3) Enter your e-mail address and click Sign Up!    Additional Information  If you have questions, please e-mail [email or web address] or call [phone] to talk to our [facility] staff. Remember, [facility] is NOT to be used for urgent needs. For medical emergencies, dial 911.         Language Assistance Services     ATTENTION: Language assistance services are available, free of charge. " Please call [phone].      ATENCIÓN: Si habla español, tiene a almanza disposición servicios gratuitos de asistencia lingüística. Llame al [phone].     CHÚ Ý: N?u b?n nói Ti?ng Vi?t, có các d?ch v? h? tr? ngôn ng? mi?n phí dành cho b?n. G?i s? [phone].         [facility] - OB/ GYN complies with applicable Federal civil rights laws and does not discriminate on the basis of race, color, national origin, age, disability, or sex.

## 2017-02-28 NOTE — PROGRESS NOTES
"[facility]  Ambulatory Clinic  Obstetrics & Gynecology    Date of Visit:  2017    Chief Complaint:  Post-partum visit    Subjective:      Isabel Curry is a 31 y.o.  who is s/p repeat low transverse  delivery and BTL at 37w4d on 2017 here for post-partum visit.  Pt has no major complaints today and has been doing well since delivery.  Pt reports baby is doing well and she is bottle feeding.  Her lochia scant.  Pt denies any abdominal/pelvic pain, fevers, abnormal vaginal discharge, symptoms of post-partum blues or depression, breast complaints, GI or urinary compliants.      Review of Systems:      CONSTITUTIONAL:  No fever, chills, weakness, fatigue, decreased activity  HEENT: No headaches, vision changes  LUNGS:  No shortness of breath  HEART:  No palpitations, chest pain, abnormal swelling  BREAST:  No lump, pain, redness, skin changes  GI:  No nausea, vomiting, diarrhea, constipation, abdomen pain, blood in stool  URINARY:  No dysuria, hematuria, frequency  SKIN:  No rash, bruising  NEURO:  No headache, weakness  PSYCH:  No anxiety, depression, suicidal or homicidal ideations    Objective:     /84  Ht 5' 6" (1.676 m)  Wt 81 kg (178 lb 7.4 oz)  LMP 2016 (Exact Date)  BMI 28.8 kg/m2  Pulse 60, Resp rate 16     GENERAL:  NAD, A&Ox3, well nourished.  HEENT:  NCAT, EOMI, PERRLA, moist mucus membranes.  Neck supple without masses.  BREAST:  Pt deferred today.  LUNGS:  CTA-B.  HEART:  RRR with physiologic heart sounds.  ABDOMEN:  Soft, non-tender, non-distended without any guarding, rigidity or rebound.  Normoactive bowel sounds.  Incision - clean, dry, and intact.  Healing appropriately without signs of infection.  Fundus - firm, non-tender, below umbilicus.  EXT:  Symmetric. No cramping, claudication, or edema. +2 distal pulses. FROM.  SKIN:  No rashes, good turgor & capillary refill.  NEURO:  Grossly intact bilaterally. +2 DTR.  PSYCH:  Mood & " affect appropriate.     PELVIC:  Pt deferred today.      Chaperone present for exam.    Laboratory Data:     Lab Results   Component Value Date    WBC 6.40 2017    HGB 8.3 (L) 2017    HCT 26.1 (L) 2017    MCV 85 2017     2017     O POS    Assessment:     1. 31 y.o.  s/p cesearean delivery and BTL - doing well post-partum  2. Hepatitis B chronic carrier  3. DM - type 2    Plan:    Post-partum care instructions and precautions reviewed.  Wound care instructions.  Pelvic rest x 6 wks post-partum.  Continue prenatal vitamins, fergon and colace.   Motrin and percocet as needed.      Encourage healthy lifestyle modifications.    F/u PCP for hepatitis B and DM.    Return to clinic 3 weeks for postpartum visit, or sooner as needed.  All of her questions were answered to her satisfactions and she voiced understanding.       Chavez Gonzales MD

## 2017-03-16 ENCOUNTER — OFFICE VISIT (OUTPATIENT)
Dept: OBSTETRICS AND GYNECOLOGY | Facility: CLINIC | Age: 32
End: 2017-03-16
Payer: MEDICAID

## 2017-03-16 VITALS
SYSTOLIC BLOOD PRESSURE: 114 MMHG | BODY MASS INDEX: 29.14 KG/M2 | WEIGHT: 181.31 LBS | HEIGHT: 66 IN | DIASTOLIC BLOOD PRESSURE: 72 MMHG

## 2017-03-16 DIAGNOSIS — E11.9 TYPE 2 DIABETES MELLITUS WITHOUT COMPLICATION, UNSPECIFIED LONG TERM INSULIN USE STATUS: ICD-10-CM

## 2017-03-16 DIAGNOSIS — B18.1 HEPATITIS B, CHRONIC: ICD-10-CM

## 2017-03-16 PROBLEM — O24.913 DIABETES MELLITUS AFFECTING PREGNANCY IN THIRD TRIMESTER: Status: RESOLVED | Noted: 2017-01-06 | Resolved: 2017-03-16

## 2017-03-16 PROCEDURE — 99212 OFFICE O/P EST SF 10 MIN: CPT | Mod: PBBFAC | Performed by: OBSTETRICS & GYNECOLOGY

## 2017-03-16 PROCEDURE — 99499 UNLISTED E&M SERVICE: CPT | Mod: S$PBB,,, | Performed by: OBSTETRICS & GYNECOLOGY

## 2017-03-16 PROCEDURE — 99999 PR PBB SHADOW E&M-EST. PATIENT-LVL II: CPT | Mod: PBBFAC,,, | Performed by: OBSTETRICS & GYNECOLOGY

## 2017-03-16 NOTE — MR AVS SNAPSHOT
[facility]  [address]  [address]  [place] [address]  Phone: [phone]                  Isabel Curry   3/16/2017 1:30 PM   Office Visit    Description:  Female : 1985   Provider:  Chavez Gonzales MD   Department[facility]  [facility]           Reason for Visit     Postpartum Care                To Do List           Goals (5 Years of Data)     None      [facility]     [facility] On Call Nurse Care Line -  Assistance  Registered nurses in the [facility] provide clinical advisement, health education, appointment booking, and other advisory services.  Call for this free service at [phone].             Medications           Message regarding Medications     Verify the changes and/or additions to your medication regime listed below are the same as discussed with your clinician today.  If any of these changes or additions are incorrect, please notify your healthcare provider.        STOP taking these medications     blood sugar diagnostic Strp 1 strip by Misc.(Non-Drug; Combo Route) route 4 (four) times daily as needed (fasting and 2 hours after each meals).    blood-glucose meter kit Use as instructed    docusate sodium (COLACE) 100 MG capsule Take 1 capsule (100 mg total) by mouth 2 (two) times daily as needed for Constipation.    EASY TOUCH LANCING DEVICE Misc test FOUR TIMES DAILY    ferrous gluconate (FERGON) 325 MG Tab Take 1 tablet (325 mg total) by mouth 2 (two) times daily with meals.    ibuprofen (ADVIL,MOTRIN) 600 MG tablet Take 1 tablet (600 mg total) by mouth every 6 (six) hours as needed for Pain.    lancets 30 gauge Misc 1 lancet by Misc.(Non-Drug; Combo Route) route 4 (four) times daily as needed.    oxycodone-acetaminophen (PERCOCET) 5-325 mg per tablet Take 1 tablet by mouth every 4 (four) hours as needed for Pain.    prenatal multivit-Ca-min-Fe-FA (PRENATAL VITAMIN) Tab Take 1 tablet by mouth once daily.    ranitidine (ZANTAC) 150 MG  tablet Take 1 tablet (150 mg total) by mouth nightly as needed for Heartburn.    TRUE METRIX GLUCOSE METER Misc USE AS DIRECTED           Verify that the below list of medications is an accurate representation of the medications you are currently taking.  If none reported, the list may be blank. If incorrect, please contact your healthcare provider. Carry this list with you in case of emergency.           Current Medications            Clinical Reference Information           Prenatal Vitals     Enc. Date GA Prenatal Vitals Prenatal Pulse Pain Level Urine Albumin/Glucose Edema Presentation Dilation/Effacement/Station    3/16/17 37w4d 114/72 / 82.2 kg (181 lb 5.3 oz)           2/23/17 37w4d 115/84 / 81 kg (178 lb 7.4 oz)           1/30/17 37w4d Admission Dx: Pregnancy Dept: [facility]    1/26/17 37w0d 142/76 (A) / 98.9 kg (218 lb 2.3 oz) 38 cm / 150 NST / Present 66 0 Negative / Negative None / None  0 / 0    1/23/17 36w4d 140/82 (A) / 94.3 kg (208 lb 0.1 oz) 37 cm / 150 NST / Present 70 0 Negative / Negative None / None  0 / 0    1/19/17 36w0d 132/72 / 93.9 kg (207 lb) 36 cm / 150 NST / Present 72 0 Negative / Trace None / None  0 / 0    1/16/17 35w4d 122/74 / 93 kg (205 lb) 36 cm / 145 NST / Present 60 0 Negative / Negative None / None      1/12/17 35w0d Admission Dept: [facility] L&D    1/12/17 35w0d 110/70 / 93.8 kg (206 lb 10.9 oz) 35 cm / 146 / Present 62 0 Negative / Negative None / None      1/9/17 34w4d 132/82 / 94.1 kg (207 lb 7.3 oz) 34 cm / 140 NST / Present 68 0 Trace / Negative None / None      1/5/17 34w0d 132/86 / 91.9 kg (202 lb 9.6 oz) 34 cm / 135 NST / Present 70 0 Negative / Negative None / None      1/2/17 33w4d Admission Dx: Normal pregnancy Dept: [facility] L&D    12/29/16 33w0d 127/81 / 92.6 kg (204 lb 2.3 oz) 34 cm / 130 NST / Present 64 0 Negative / Negative None / None      12/26/16 32w4d Admission Dx: Pregnancy Dept: [facility] L&D    12/22/16 32w0d 124/77 / 90.7 kg (199 lb 15.3 oz) 33 cm / 135 NST /  "Present 72 0 Negative / Negative None / None      12/19/16 31w4d 120/70 / 92.2 kg (203 lb 4.2 oz) 32 cm / 150 NST / Present 60 0 Negative / Negative None / None      12/13/16 30w5d 123/76 / 90.9 kg (200 lb 6.4 oz) 31 cm / 142 / Present 68 0 Negative / Negative None / None      11/15/16 26w5d 120/70 / 89.9 kg (198 lb 3.1 oz) 26 cm / 146 / Present 70 0 Negative / Negative None / None      10/18/16 22w5d 110/74 / 88.2 kg (194 lb 8 oz)  / 150 / Present 64 0 Negative / Negative None / None      10/4/16 20w5d 121/76 / 87.5 kg (193 lb)  / 146 / Present 60 0 Negative / 1+ None / None      9/20/16 18w5d 120/80 / 86.6 kg (191 lb)  / 154 / Present 72 0 Negative / 1+ None / None      9/13/16 17w5d 119/79 / 86.6 kg (191 lb)  / 160 64 0 Negative / Negative None / None      8/16/16 13w5d 121/79 / 85.7 kg (189 lb)  / 158 70 0 Negative / Negative None / None  0 / 0       Number of babies: 1   Height: 5' 6" (1.676 m)       Your Vitals Were     BP Height Weight Last Period BMI    114/72 (BP Location: Left arm, Patient Position: Sitting, BP Method: Manual) 5' 6" (1.676 m) 82.2 kg (181 lb 5.3 oz) 05/20/2016 (Exact Date) 29.27 kg/m2      Blood Pressure          Most Recent Value    BP  114/72      Allergies as of 3/16/2017     No Known Allergies      Immunizations Administered on Date of Encounter - 3/16/2017     None      [facility] Sign-Up     Activating your [facility] account is as easy as 1-2-3!     1) Visit [email or web address], select Sign Up Now, enter this activation code and your date of birth, then select Next.  [email or web address]  Expires: 4/9/2017  4:43 PM      2) Create a username and password to use when you visit [facility] in the future and select a security question in case you lose your password and select Next.    3) Enter your e-mail address and click Sign Up!    Additional Information  If you have questions, please e-mail [email or web address] or call [phone] to talk to our [facility] staff. Remember, [facility] is NOT to " be used for urgent needs. For medical emergencies, dial 911.         Language Assistance Services     ATTENTION: Language assistance services are available, free of charge. Please call [phone].      ATENCIÓN: Si habla cuong, tiene a almanza disposición servicios gratuitos de asistencia lingüística. Llame al [phone].     CHÚ Ý: N?u b?n nói Ti?ng Vi?t, có các d?ch v? h? tr? ngôn ng? mi?n phí dành cho b?n. G?i s? [phone].         [facility] - OB/ GYN complies with applicable Federal civil rights laws and does not discriminate on the basis of race, color, national origin, age, disability, or sex.

## 2017-03-17 NOTE — PROGRESS NOTES
"[facility]  Ambulatory Clinic  Obstetrics & Gynecology    Date of Visit:  3/16/2017    Chief Complaint:  Post-partum visit    Subjective:      Isabel Curry is a 31 y.o.  who is s/p repeat low transverse  delivery and BTL at 37w4d on 2017 here for post-partum visit.      Pt has no major complaints today.    Doing well since delivery.      Baby well, bottle feeding.      Lochia resolved.      Pt denies any abdominal/pelvic pain, fevers, abnormal vaginal discharge, symptoms of post-partum blues or depression, breast complaints, GI or urinary compliants.      Review of Systems:      CONSTITUTIONAL:  No fever, chills, weakness, fatigue, decreased activity  HEENT: No headaches, vision changes  LUNGS:  No shortness of breath  HEART:  No palpitations, chest pain, abnormal swelling  BREAST:  No lump, pain, redness, skin changes  GI:  No nausea, vomiting, diarrhea, constipation, abdomen pain, blood in stool  URINARY:  No dysuria, hematuria, frequency  SKIN:  No rash, bruising  NEURO:  No headache, weakness  PSYCH:  No anxiety, depression, suicidal or homicidal ideations    Objective:     /72 (BP Location: Left arm, Patient Position: Sitting, BP Method: Manual)  Ht 5' 6" (1.676 m)  Wt 82.2 kg (181 lb 5.3 oz)  LMP 2016 (Exact Date)  Breastfeeding? No  BMI 29.27 kg/m2  Pulse 68, Resp rate 14     GENERAL:  NAD, A&Ox3, well nourished.  HEENT:  NCAT, EOMI, PERRLA, moist mucus membranes, neck supple without masses.  BREAST:  Symmetric, no tender, no masses, no abnormal discharge.  LUNGS:  CTA-B.  HEART:  RRR with physiologic heart sounds.  ABDOMEN:  Soft, non-tender, non-distended without any guarding, rigidity or rebound.  Normoactive bowel sounds.  Incision well healed.  EXT:  Symmetric. No cramping, claudication, or edema. +2 distal pulses. FROM.  SKIN:  No rashes, good turgor & capillary refill.  NEURO:  Grossly intact bilaterally. +2 DTR.  PSYCH:  Mood & affect " appropriate.     GENITOURINARY:  NFEG no lesion. No vaginal or cervical lesion. No bleeding or discharge. No CMT. Uterus and ovaries small, NT. Declined rectal exam. No obvious external lesions.    Chaperone present for exam.    Assessment:     1. 31 y.o.  s/p cesearean delivery and BTL - doing well post-partum  2. Hepatitis B chronic carrier  3. DM - type 2    Plan:    Post-partum care instructions and precautions reviewed.      Encourage healthy lifestyle modifications.    F/u PCP for hepatitis B and DM.    Return 1 year for annual GYN exam, or sooner as needed.      Pt voiced understanding.       Chavez Gonzales MD

## 2017-03-28 ENCOUNTER — TELEPHONE (OUTPATIENT)
Dept: OBSTETRICS AND GYNECOLOGY | Facility: CLINIC | Age: 32
End: 2017-03-28

## 2017-03-28 NOTE — TELEPHONE ENCOUNTER
----- Message from Jose Barrera sent at 3/28/2017 12:58 PM CDT -----  Contact: self  Pt is requesting a release to work form. Please contact her at [phone].    Thanks

## 2017-03-28 NOTE — TELEPHONE ENCOUNTER
Contacted pt and informed her a letter to return to work will be ready for pickup tomorrow after 12 pm. Pt voiced understanding. kt

## 2017-07-18 ENCOUNTER — OFFICE VISIT (OUTPATIENT)
Dept: OBSTETRICS AND GYNECOLOGY | Facility: CLINIC | Age: 32
End: 2017-07-18
Payer: MEDICAID

## 2017-07-18 VITALS
WEIGHT: 174.81 LBS | HEIGHT: 66 IN | BODY MASS INDEX: 28.09 KG/M2 | DIASTOLIC BLOOD PRESSURE: 68 MMHG | SYSTOLIC BLOOD PRESSURE: 120 MMHG

## 2017-07-18 DIAGNOSIS — B37.31 VAGINAL YEAST INFECTION: ICD-10-CM

## 2017-07-18 DIAGNOSIS — Z01.419 VISIT FOR GYNECOLOGIC EXAMINATION: Primary | ICD-10-CM

## 2017-07-18 PROCEDURE — 99212 OFFICE O/P EST SF 10 MIN: CPT | Mod: PBBFAC | Performed by: OBSTETRICS & GYNECOLOGY

## 2017-07-18 PROCEDURE — 99395 PREV VISIT EST AGE 18-39: CPT | Mod: S$PBB,,, | Performed by: OBSTETRICS & GYNECOLOGY

## 2017-07-18 PROCEDURE — 99999 PR PBB SHADOW E&M-EST. PATIENT-LVL II: CPT | Mod: PBBFAC,,, | Performed by: OBSTETRICS & GYNECOLOGY

## 2017-07-18 RX ORDER — FLUCONAZOLE 150 MG/1
150 TABLET ORAL
Qty: 3 TABLET | Refills: 3 | Status: SHIPPED | OUTPATIENT
Start: 2017-07-18 | End: 2021-10-26

## 2017-07-18 NOTE — PROGRESS NOTES
"[facility]  Ambulatory Clinic  Obstetrics & Gynecology    Visit Date:  2017    Chief Complaint:  Annual GYN exam, vaginal discharge    History of Present Illness:      Iasbel Curry is a 32 y.o. , new pt to me, here for a gynecologic exam with c/o vaginal yeast infection.    Pt has no major complaints today.      Menses are regular, not heavy or painful.    Pt current method of family planning is tubal ligation.    Pt denies family h/o adverse reaction to hormonal contraception.    Last pap ~2016 normal.    Pt denies active sexually transmitted infections.    Pt performs monthly self breast examination, non-smoker, uses seat belts, and denies abuse.     Pt denies abnormal vaginal bleeding, vaginal discharge, dysmenorrhea, dyspareunia, pelvic pain, bloating, early satiety, unintentional weight loss, breast mass/skin changes, incontinence, GI or urinary complaints.      Otherwise, the pt is in her usual state of health and has good follow-up with her PCP.    Past History:  Gynecologic history as noted above.    Review of Systems:      GENERAL:  No fever, fatigue, excessive weight gain or loss  HEENT:  No headaches, hearing changes, visual disturbance  RESPIRATORY:  No cough, shortness of breath  CARDIOVASCULAR:  No chest pain, heart palpitations, leg swelling  BREAST:  No lump, pain, nipple discharge, skin changes  GASTROINTESTINAL:  No nausea, vomiting, constipation, diarrhea, abd pain, rectal bleeding   GENITOURINARY:  See HPI  ENDOCRINE:  No heat or cold intolerance  HEMATOLOGIC:  No easy bruisability or bleeding   LYMPHATICS:  No enlarged nodes  MUSCULOSKELETAL:  No joint pain or swelling  SKIN:  No rash, lesions, jaundice  NEUROLOGIC:  No dizziness, weakness, syncope  PSYCHIATRIC:  No depression, homicidal/suicidal ideations, anxiety or mood swings    Physical Exam:     /68 (BP Location: Left arm, Patient Position: Sitting, BP Method: Manual)   Ht 5' 6" (1.676 m)  "  Wt 79.3 kg (174 lb 13.2 oz)   LMP 2017 (Approximate)   Breastfeeding? No   BMI 28.22 kg/m²      GENERAL:  No acute distress, well-nourished  HEENT:  Atraumatic, anicteric, moist mucus membranes. Neck supple w/o masses.  BREAST:  Symmetric, nontender, no obvious masses, adenopathy, skin changes or nipple discharge.  LUNGS:  Clear to auscultation  HEART:  Regular rate and rhythm, no murmurs, gallops, or rubs  ABDOMEN:  Soft, non-tender, non-distended, normoactive bowel sounds, no obvious organomegaly  EXT:  Symmetric w/o cramping, claudication, or edema. +2 distal pulses.  SKIN:  No rashes or bruising  PSYCH:  Mood and affect appropriate  GENITOURINARY:  NFEG no lesion. No vaginal or cervical lesion. No bleeding or discharge. No CMT. Uterus and ovaries small, NT. Wet prep negative. Declined rectal exam. No obvious external lesions.    Chaperone present for exam.    Assessment:     32 y.o.  with h/o BTL:    1. Routine gynecologic exam  2. Vaginal yeast infection    Plan:    A gynecologic health assessment was performed with age appropriate counseling.    Cervical cancer screening - pap up to date.    STI screening - pt declined.      Diflucan 150 mg po x 1 for yeast infection.  If unresolved, use monistat 7.  Hygiene advice.    Encourage healthy lifestyle modifications, monthly self breast exams, Ca/Vit D, f/u with PCP for health maintenance.    Return 1 year for gynecologic exam, or sooner as needed.  All questions answered, pt voiced understanding.        Chavez Gonzales MD

## 2020-07-07 ENCOUNTER — OFFICE VISIT (OUTPATIENT)
Dept: OBSTETRICS AND GYNECOLOGY | Facility: CLINIC | Age: 35
End: 2020-07-07
Payer: MEDICAID

## 2020-07-07 VITALS
HEIGHT: 66 IN | SYSTOLIC BLOOD PRESSURE: 122 MMHG | BODY MASS INDEX: 29.85 KG/M2 | WEIGHT: 185.75 LBS | DIASTOLIC BLOOD PRESSURE: 80 MMHG

## 2020-07-07 DIAGNOSIS — Z12.4 CERVICAL CANCER SCREENING: ICD-10-CM

## 2020-07-07 DIAGNOSIS — Z11.3 SCREEN FOR STD (SEXUALLY TRANSMITTED DISEASE): ICD-10-CM

## 2020-07-07 DIAGNOSIS — Z01.419 WELL WOMAN EXAM WITH ROUTINE GYNECOLOGICAL EXAM: Primary | ICD-10-CM

## 2020-07-07 PROCEDURE — 87491 CHLMYD TRACH DNA AMP PROBE: CPT

## 2020-07-07 PROCEDURE — 99395 PR PREVENTIVE VISIT,EST,18-39: ICD-10-PCS | Mod: S$PBB,,, | Performed by: OBSTETRICS & GYNECOLOGY

## 2020-07-07 PROCEDURE — 99999 PR PBB SHADOW E&M-EST. PATIENT-LVL III: CPT | Mod: PBBFAC,,, | Performed by: OBSTETRICS & GYNECOLOGY

## 2020-07-07 PROCEDURE — 99999 PR PBB SHADOW E&M-EST. PATIENT-LVL III: ICD-10-PCS | Mod: PBBFAC,,, | Performed by: OBSTETRICS & GYNECOLOGY

## 2020-07-07 PROCEDURE — 88142 CYTOPATH C/V THIN LAYER: CPT

## 2020-07-07 PROCEDURE — 87624 HPV HI-RISK TYP POOLED RSLT: CPT

## 2020-07-07 PROCEDURE — 99395 PREV VISIT EST AGE 18-39: CPT | Mod: S$PBB,,, | Performed by: OBSTETRICS & GYNECOLOGY

## 2020-07-07 PROCEDURE — 99213 OFFICE O/P EST LOW 20 MIN: CPT | Mod: PBBFAC | Performed by: OBSTETRICS & GYNECOLOGY

## 2020-07-10 LAB
C TRACH DNA SPEC QL NAA+PROBE: NOT DETECTED
N GONORRHOEA DNA SPEC QL NAA+PROBE: NOT DETECTED

## 2020-07-15 LAB
HPV HR 12 DNA SPEC QL NAA+PROBE: NEGATIVE
HPV16 AG SPEC QL: NEGATIVE
HPV18 DNA SPEC QL NAA+PROBE: NEGATIVE

## 2020-07-16 LAB
FINAL PATHOLOGIC DIAGNOSIS: NORMAL
Lab: NORMAL

## 2020-07-19 NOTE — PROGRESS NOTES
"[facility]  Ambulatory Clinic  Obstetrics & Gynecology    Visit Date:  2020    Chief Complaint:  Annual GYN exam    History of Present Illness:      Isabel Curry is a 35 y.o.  here for annual GYN exam.  Pt has no major complaints today.  Menses are regular/monthly, not heavy or painful.  Pt current method of family planning is tubal ligation.  Last pap ~2016 normal.  Pt denies active sexually transmitted infections.  Pt performs monthly self breast examination, non-smoker, uses seat belts, and denies abuse.   Pt denies abnormal vaginal bleeding, vaginal discharge, dysmenorrhea, dyspareunia, pelvic pain, bloating, early satiety, unintentional weight loss, breast mass/skin changes, incontinence, GI or urinary complaints.    Otherwise, the pt is in her usual state of health.    Review of Systems:    GENERAL:  No fever, fatigue, excessive weight gain or loss  HEENT:  No head injury, headaches, hearing changes, visual disturbance  RESPIRATORY:  No cough, shortness of breath  CARDIOVASCULAR:  No chest pain, heart palpitations, leg swelling  BREAST:  No lump, pain, nipple discharge, skin changes  GASTROINTESTINAL:  No nausea, vomiting, constipation, diarrhea, abd pain, rectal bleeding   URINARY:  No dysuria, frequency, hematuria  GENITOURINARY:  See HPI  ENDOCRINE:  No heat or cold intolerance  HEMATOLOGIC:  No easy bruisability or bleeding   LYMPHATICS:  No enlarged nodes  MUSCULOSKELETAL:  No acute joint pain or swelling  SKIN:  No rash, lesions, jaundice  NEUROLOGIC:  No dizziness, weakness, syncope  PSYCHIATRIC:  No significant mood changes, suicidal or homicidal ideations    Physical Exam:     /80 (BP Location: Right arm, Patient Position: Sitting)   Ht 5' 6" (1.676 m)   Wt 84.2 kg (185 lb 11.8 oz)   LMP 2020 (Approximate)   BMI 29.98 kg/m²      GENERAL:  No acute distress, well-nourished  HEENT:  Atraumatic, anicteric, moist mucus membranes, neck supple w/o " masses.  BREAST:  Symmetric, non-tender, no obvious masses, no skin changes, no nipple discharge.  LUNGS:  Clear to auscultation  HEART:  Regular rate and rhythm, no murmurs, gallops, or rubs  ABDOMEN:  Soft, non-tender, non-distended, normoactive bowel sounds, no obvious organomegaly  EXT:  Symmetric w/o cramping, claudication, or edema. +2 distal pulses.  SKIN:  No rashes or bruising  PSYCH:  Mood and affect appropriate    GENITOURINARY:  VULVAR:  Female external genitalia w/o any obvious lesions. Female hair distribution. Adequate perineal body. Normal urethral meatus. No gross lymphadenopathy.   VAGINA:  Pink, moist, well-rugated. Good support. No obvious lesion. No discharge.  CERVIX:  No cervical motion tenderness, discharge, or obvious lesions.   UTERUS:  Small, non-tender, normal contour  ADNEXA:  No masses, non-tender    RECTAL:  Declined. No obvious external lesions    Chaperone present for exam.    Assessment:     35 y.o.  with h/o BTL:    1. Well woman gynecologic exam    Plan:    A gynecologic health assessment was performed with age appropriate counseling.  Cervical cancer screening - pap obtained.   STI screening - pt requested gonorrhea & chlamydia testing.  Pt declined other STI testing including HIV.  Safe sex discussed.    Encourage healthy lifestyle modifications, monthly self breast exams, Ca/Vit D, f/u with PCP for health maintenance.  Return 1 year for gynecologic exam, or sooner as needed.    All questions answered, pt voiced understanding.        Chavez Gonzales MD

## 2021-10-26 ENCOUNTER — OFFICE VISIT (OUTPATIENT)
Dept: OBSTETRICS AND GYNECOLOGY | Facility: CLINIC | Age: 36
End: 2021-10-26
Payer: MEDICAID

## 2021-10-26 VITALS — WEIGHT: 178.88 LBS | BODY MASS INDEX: 28.88 KG/M2

## 2021-10-26 DIAGNOSIS — B37.31 YEAST INFECTION OF THE VAGINA: Primary | ICD-10-CM

## 2021-10-26 DIAGNOSIS — Z01.419 GYNECOLOGIC EXAM NORMAL: ICD-10-CM

## 2021-10-26 PROCEDURE — 99395 PR PREVENTIVE VISIT,EST,18-39: ICD-10-PCS | Mod: S$PBB,,, | Performed by: OBSTETRICS & GYNECOLOGY

## 2021-10-26 PROCEDURE — 99212 OFFICE O/P EST SF 10 MIN: CPT | Mod: PBBFAC | Performed by: OBSTETRICS & GYNECOLOGY

## 2021-10-26 PROCEDURE — 99395 PREV VISIT EST AGE 18-39: CPT | Mod: S$PBB,,, | Performed by: OBSTETRICS & GYNECOLOGY

## 2021-10-26 PROCEDURE — 99999 PR PBB SHADOW E&M-EST. PATIENT-LVL II: ICD-10-PCS | Mod: PBBFAC,,, | Performed by: OBSTETRICS & GYNECOLOGY

## 2021-10-26 PROCEDURE — 99999 PR PBB SHADOW E&M-EST. PATIENT-LVL II: CPT | Mod: PBBFAC,,, | Performed by: OBSTETRICS & GYNECOLOGY

## 2021-10-26 PROCEDURE — 87481 CANDIDA DNA AMP PROBE: CPT | Mod: 59 | Performed by: OBSTETRICS & GYNECOLOGY

## 2021-10-26 RX ORDER — FLUCONAZOLE 150 MG/1
150 TABLET ORAL DAILY
Qty: 1 TABLET | Refills: 0 | Status: SHIPPED | OUTPATIENT
Start: 2021-10-26 | End: 2021-10-27

## 2021-10-26 RX ORDER — CLOTRIMAZOLE AND BETAMETHASONE DIPROPIONATE 10; .64 MG/G; MG/G
CREAM TOPICAL
Qty: 45 G | Refills: 1 | Status: SHIPPED | OUTPATIENT
Start: 2021-10-26 | End: 2022-10-26

## 2021-10-28 LAB
BACTERIAL VAGINOSIS DNA: NEGATIVE
CANDIDA GLABRATA DNA: NEGATIVE
CANDIDA KRUSEI DNA: NEGATIVE
CANDIDA RRNA VAG QL PROBE: POSITIVE
T VAGINALIS RRNA GENITAL QL PROBE: NEGATIVE

## 2021-12-08 ENCOUNTER — OFFICE VISIT (OUTPATIENT)
Dept: OBSTETRICS AND GYNECOLOGY | Facility: CLINIC | Age: 36
End: 2021-12-08
Payer: MEDICAID

## 2021-12-08 VITALS
WEIGHT: 177.25 LBS | SYSTOLIC BLOOD PRESSURE: 132 MMHG | BODY MASS INDEX: 28.49 KG/M2 | DIASTOLIC BLOOD PRESSURE: 70 MMHG | HEIGHT: 66 IN

## 2021-12-08 DIAGNOSIS — Z01.419 WELL WOMAN EXAM WITH ROUTINE GYNECOLOGICAL EXAM: Primary | ICD-10-CM

## 2021-12-08 DIAGNOSIS — B37.31 VAGINAL YEAST INFECTION: ICD-10-CM

## 2021-12-08 PROCEDURE — 99213 OFFICE O/P EST LOW 20 MIN: CPT | Mod: PBBFAC | Performed by: OBSTETRICS & GYNECOLOGY

## 2021-12-08 PROCEDURE — 99395 PREV VISIT EST AGE 18-39: CPT | Mod: S$PBB,,, | Performed by: OBSTETRICS & GYNECOLOGY

## 2021-12-08 PROCEDURE — 99999 PR PBB SHADOW E&M-EST. PATIENT-LVL III: ICD-10-PCS | Mod: PBBFAC,,, | Performed by: OBSTETRICS & GYNECOLOGY

## 2021-12-08 PROCEDURE — 99395 PR PREVENTIVE VISIT,EST,18-39: ICD-10-PCS | Mod: S$PBB,,, | Performed by: OBSTETRICS & GYNECOLOGY

## 2021-12-08 PROCEDURE — 99999 PR PBB SHADOW E&M-EST. PATIENT-LVL III: CPT | Mod: PBBFAC,,, | Performed by: OBSTETRICS & GYNECOLOGY

## 2021-12-08 RX ORDER — ONDANSETRON 4 MG/1
4 TABLET, FILM COATED ORAL EVERY 6 HOURS PRN
COMMUNITY
Start: 2021-09-14

## 2021-12-08 RX ORDER — CYCLOBENZAPRINE HCL 5 MG
5 TABLET ORAL EVERY 6 HOURS PRN
COMMUNITY
Start: 2021-09-14

## 2021-12-08 RX ORDER — OXYCODONE AND ACETAMINOPHEN 5; 325 MG/1; MG/1
1 TABLET ORAL
COMMUNITY
Start: 2021-09-14

## 2021-12-08 RX ORDER — SILVER SULFADIAZINE 10 G/1000G
CREAM TOPICAL 2 TIMES DAILY PRN
COMMUNITY
Start: 2021-09-09

## 2021-12-08 RX ORDER — FLUCONAZOLE 150 MG/1
150 TABLET ORAL
Qty: 6 TABLET | Refills: 1 | Status: SHIPPED | OUTPATIENT
Start: 2021-12-08 | End: 2022-08-03 | Stop reason: SDUPTHER

## 2024-02-22 NOTE — PROGRESS NOTES
36w0d with DM-type 2 on glyburide, gestational HTN, Hep B chronic carrier,  x 2.     Pt here for OB visit and NST secondary to GDM-A2 and gestational HTN.  Pt has no major omplaints.   Reports very active fetus.  Denies vaginal bleeding, leakage of fluid, or contractions.  Denies headaches, vision changes, epigastric pain, or acute swelling.    Glucose log reviewed since last visit .  Fasting 61-90 and 2 hrs  (outlyier 143, 164).  Continue glyburide to 1.25 mg qhs and 2.5 mg at qnoon.  Go to L&D if fasting > 120, 2hr > 160.     BP log reviewed since last visit, mostly normal range, max 145/88.   Continue BP monitoring TID.  Go to L&D if BP > 150/95.  PreE precautions.    NST: Baseline 150, moderate variability, positive acceleration, no decelerations.   [place]: No contraction.  Continue twice wkly NST until delivery.    Plan for delivery ~37-38 wks secondary to gestational HTN.    Labor/DM/preE precautions.  Kick counts.  Voiced understanding.   present for visit.  
Diabetic OB here for NST. sal  
No

## 2024-05-25 ENCOUNTER — HOSPITAL ENCOUNTER (EMERGENCY)
Facility: HOSPITAL | Age: 39
Discharge: HOME | End: 2024-05-25
Attending: EMERGENCY MEDICINE
Payer: COMMERCIAL

## 2024-05-25 VITALS
RESPIRATION RATE: 20 BRPM | BODY MASS INDEX: 27.32 KG/M2 | HEART RATE: 81 BPM | WEIGHT: 170 LBS | DIASTOLIC BLOOD PRESSURE: 74 MMHG | OXYGEN SATURATION: 99 % | TEMPERATURE: 98 F | HEIGHT: 66 IN | SYSTOLIC BLOOD PRESSURE: 111 MMHG

## 2024-05-25 DIAGNOSIS — M25.519 SHOULDER PAIN: ICD-10-CM

## 2024-05-25 DIAGNOSIS — M79.659 THIGH PAIN: ICD-10-CM

## 2024-05-25 DIAGNOSIS — M54.2 NECK PAIN: ICD-10-CM

## 2024-05-25 DIAGNOSIS — M25.569 KNEE PAIN: ICD-10-CM

## 2024-05-25 DIAGNOSIS — M79.603 ARM PAIN: ICD-10-CM
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
## 2024-05-25 LAB
B-HCG UR QL: NEGATIVE
CTP QC/QA: YES

## 2024-05-25 PROCEDURE — 25000003 PHARM REV CODE 250: Performed by: PHYSICIAN ASSISTANT

## 2024-05-25 PROCEDURE — 81025 URINE PREGNANCY TEST: CPT | Performed by: EMERGENCY MEDICINE

## 2024-05-25 PROCEDURE — 63600175 PHARM REV CODE 636 W HCPCS: Performed by: PHYSICIAN ASSISTANT

## 2024-05-25 PROCEDURE — 99284 EMERGENCY DEPT VISIT MOD MDM: CPT | Mod: 25

## 2024-05-25 PROCEDURE — 96372 THER/PROPH/DIAG INJ SC/IM: CPT | Performed by: PHYSICIAN ASSISTANT

## 2024-05-25 RX ORDER — KETOROLAC TROMETHAMINE 30 MG/ML
30 INJECTION, SOLUTION INTRAMUSCULAR; INTRAVENOUS
Status: COMPLETED | OUTPATIENT
Start: 2024-05-25 | End: 2024-05-25

## 2024-05-25 RX ORDER — CYCLOBENZAPRINE HCL 5 MG
10 TABLET ORAL
Status: COMPLETED | OUTPATIENT
Start: 2024-05-25 | End: 2024-05-25

## 2024-05-25 RX ORDER — IBUPROFEN 600 MG/1
600 TABLET ORAL EVERY 6 HOURS PRN
Qty: 20 TABLET | Refills: 0 | Status: SHIPPED | OUTPATIENT
Start: 2024-05-25 | End: 2024-05-30

## 2024-05-25 RX ORDER — ACETAMINOPHEN 500 MG
500 TABLET ORAL EVERY 4 HOURS PRN
Qty: 20 TABLET | Refills: 0 | Status: SHIPPED | OUTPATIENT
Start: 2024-05-25 | End: 2024-05-30

## 2024-05-25 RX ORDER — CYCLOBENZAPRINE HCL 10 MG
10 TABLET ORAL 3 TIMES DAILY PRN
Qty: 20 TABLET | Refills: 0 | Status: SHIPPED | OUTPATIENT
Start: 2024-05-25 | End: 2024-06-01

## 2024-05-25 RX ADMIN — KETOROLAC TROMETHAMINE 30 MG: 30 INJECTION, SOLUTION INTRAMUSCULAR at 08:05

## 2024-05-25 RX ADMIN — CYCLOBENZAPRINE HYDROCHLORIDE 10 MG: 5 TABLET, FILM COATED ORAL at 08:05

## 2024-05-25 NOTE — Clinical Note
"Isabel Price" Patricio was seen and treated in our emergency department on 5/25/2024.  She may return to work on 05/27/2024.       If you have any questions or concerns, please don't hesitate to call.      Dotty Kong PA-C"
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
## 2024-05-26 NOTE — DISCHARGE INSTRUCTIONS
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
## (undated) DEVICE — PAD ABD 8X10 STERILE